# Patient Record
Sex: FEMALE | Race: WHITE | Employment: OTHER | URBAN - METROPOLITAN AREA
[De-identification: names, ages, dates, MRNs, and addresses within clinical notes are randomized per-mention and may not be internally consistent; named-entity substitution may affect disease eponyms.]

---

## 2017-03-23 ENCOUNTER — HOSPITAL ENCOUNTER (OUTPATIENT)
Dept: ULTRASOUND IMAGING | Age: 62
Discharge: HOME | End: 2017-03-23
Attending: INTERNAL MEDICINE
Payer: MEDICARE

## 2017-03-23 DIAGNOSIS — K74.3 PRIMARY BILIARY CIRRHOSIS (HCC): ICD-10-CM

## 2017-03-23 PROCEDURE — 76705 ECHO EXAM OF ABDOMEN: CPT

## 2017-11-08 ENCOUNTER — ANESTHESIA EVENT (OUTPATIENT)
Dept: ENDOSCOPY | Age: 62
End: 2017-11-08
Payer: MEDICARE

## 2017-11-08 RX ORDER — SODIUM CHLORIDE 0.9 % (FLUSH) 0.9 %
5-10 SYRINGE (ML) INJECTION AS NEEDED
Status: CANCELLED | OUTPATIENT
Start: 2017-11-08

## 2017-11-08 RX ORDER — SODIUM CHLORIDE, SODIUM LACTATE, POTASSIUM CHLORIDE, CALCIUM CHLORIDE 600; 310; 30; 20 MG/100ML; MG/100ML; MG/100ML; MG/100ML
100 INJECTION, SOLUTION INTRAVENOUS CONTINUOUS
Status: CANCELLED | OUTPATIENT
Start: 2017-11-08

## 2017-11-09 ENCOUNTER — ANESTHESIA (OUTPATIENT)
Dept: ENDOSCOPY | Age: 62
End: 2017-11-09
Payer: MEDICARE

## 2017-11-09 ENCOUNTER — HOSPITAL ENCOUNTER (OUTPATIENT)
Age: 62
Setting detail: OUTPATIENT SURGERY
Discharge: HOME | End: 2017-11-09
Attending: INTERNAL MEDICINE | Admitting: INTERNAL MEDICINE
Payer: MEDICARE

## 2017-11-09 VITALS
HEIGHT: 63 IN | SYSTOLIC BLOOD PRESSURE: 178 MMHG | RESPIRATION RATE: 16 BRPM | TEMPERATURE: 96.8 F | OXYGEN SATURATION: 98 % | HEART RATE: 70 BPM | DIASTOLIC BLOOD PRESSURE: 74 MMHG | WEIGHT: 186 LBS | BODY MASS INDEX: 32.96 KG/M2

## 2017-11-09 LAB — GLUCOSE BLD STRIP.AUTO-MCNC: 125 MG/DL (ref 65–100)

## 2017-11-09 PROCEDURE — 82962 GLUCOSE BLOOD TEST: CPT

## 2017-11-09 PROCEDURE — 76040000026: Performed by: INTERNAL MEDICINE

## 2017-11-09 PROCEDURE — 74011000250 HC RX REV CODE- 250

## 2017-11-09 PROCEDURE — 74011250636 HC RX REV CODE- 250/636

## 2017-11-09 PROCEDURE — 74011250636 HC RX REV CODE- 250/636: Performed by: ANESTHESIOLOGY

## 2017-11-09 PROCEDURE — 77030009426 HC FCPS BIOP ENDOSC BSC -B: Performed by: INTERNAL MEDICINE

## 2017-11-09 PROCEDURE — 76060000032 HC ANESTHESIA 0.5 TO 1 HR: Performed by: INTERNAL MEDICINE

## 2017-11-09 PROCEDURE — 88305 TISSUE EXAM BY PATHOLOGIST: CPT | Performed by: INTERNAL MEDICINE

## 2017-11-09 PROCEDURE — 74011000250 HC RX REV CODE- 250: Performed by: ANESTHESIOLOGY

## 2017-11-09 RX ORDER — SODIUM CHLORIDE, SODIUM LACTATE, POTASSIUM CHLORIDE, CALCIUM CHLORIDE 600; 310; 30; 20 MG/100ML; MG/100ML; MG/100ML; MG/100ML
100 INJECTION, SOLUTION INTRAVENOUS CONTINUOUS
Status: DISCONTINUED | OUTPATIENT
Start: 2017-11-09 | End: 2017-11-09 | Stop reason: HOSPADM

## 2017-11-09 RX ORDER — PROPOFOL 10 MG/ML
INJECTION, EMULSION INTRAVENOUS AS NEEDED
Status: DISCONTINUED | OUTPATIENT
Start: 2017-11-09 | End: 2017-11-09 | Stop reason: HOSPADM

## 2017-11-09 RX ORDER — LIDOCAINE HYDROCHLORIDE 20 MG/ML
INJECTION, SOLUTION EPIDURAL; INFILTRATION; INTRACAUDAL; PERINEURAL AS NEEDED
Status: DISCONTINUED | OUTPATIENT
Start: 2017-11-09 | End: 2017-11-09 | Stop reason: HOSPADM

## 2017-11-09 RX ORDER — PROPOFOL 10 MG/ML
INJECTION, EMULSION INTRAVENOUS
Status: DISCONTINUED | OUTPATIENT
Start: 2017-11-09 | End: 2017-11-09 | Stop reason: HOSPADM

## 2017-11-09 RX ORDER — FAMOTIDINE 20 MG/1
20 TABLET, FILM COATED ORAL AS NEEDED
Status: DISCONTINUED | OUTPATIENT
Start: 2017-11-09 | End: 2017-11-09 | Stop reason: HOSPADM

## 2017-11-09 RX ADMIN — PROPOFOL 140 MCG/KG/MIN: 10 INJECTION, EMULSION INTRAVENOUS at 12:35

## 2017-11-09 RX ADMIN — FAMOTIDINE 20 MG: 10 INJECTION, SOLUTION INTRAVENOUS at 12:29

## 2017-11-09 RX ADMIN — LIDOCAINE HYDROCHLORIDE 20 MG: 20 INJECTION, SOLUTION EPIDURAL; INFILTRATION; INTRACAUDAL; PERINEURAL at 12:35

## 2017-11-09 RX ADMIN — PROPOFOL 20 MG: 10 INJECTION, EMULSION INTRAVENOUS at 12:44

## 2017-11-09 RX ADMIN — PROPOFOL 50 MG: 10 INJECTION, EMULSION INTRAVENOUS at 12:35

## 2017-11-09 RX ADMIN — SODIUM CHLORIDE, SODIUM LACTATE, POTASSIUM CHLORIDE, AND CALCIUM CHLORIDE 100 ML/HR: 600; 310; 30; 20 INJECTION, SOLUTION INTRAVENOUS at 12:29

## 2017-11-09 NOTE — H&P
History and Physical      Patient: Khoi Smiley    Physician: Smooth Charles MD    Referring Physician: Benjie Merritt MD    Chief Complaint: For colonoscopy and EGD    History of Present Illness: Pt presents for colonoscopy and EGD. Pt with cirrhosis and needs variceal screening. Also needs CRCS. Is having diarrhea. History:  Past Medical History:   Diagnosis Date    Chronic obstructive pulmonary disease (HCC)     daily inhalers    Depression     Gallbladder cancer (HCC)     GERD (gastroesophageal reflux disease)     daily meds    Hypothyroidism     Lupus     Memory loss     OA (osteoarthritis)     Prediabetes     oral agents: doesn't check glucose at home     Past Surgical History:   Procedure Laterality Date    HX CHOLECYSTECTOMY      HX OTHER SURGICAL      liver bx    HX OTHER SURGICAL      wound debridement      Social History     Social History    Marital status:      Spouse name: N/A    Number of children: N/A    Years of education: N/A     Social History Main Topics    Smoking status: Former Smoker    Smokeless tobacco: Never Used      Comment: quit 2016    Alcohol use No    Drug use: Yes     Special: Marijuana    Sexual activity: Not Asked     Other Topics Concern    None     Social History Narrative      History reviewed. No pertinent family history. Medications:   Prior to Admission medications    Medication Sig Start Date End Date Taking? Authorizing Provider   donepezil (ARICEPT) 10 mg tablet Take 10 mg by mouth nightly. Historical Provider   dicyclomine (BENTYL) 10 mg capsule Take 10 mg by mouth daily. Historical Provider   Omeprazole delayed release (PRILOSEC D/R) 20 mg tablet Take 20 mg by mouth every morning. Historical Provider   meloxicam (MOBIC) 15 mg tablet Take 15 mg by mouth daily. Historical Provider   atorvastatin (LIPITOR) 10 mg tablet Take 10 mg by mouth nightly.     Historical Provider   levothyroxine (SYNTHROID) 50 mcg tablet Take 50 mcg by mouth Daily (before breakfast). Historical Provider   vilazodone (VIIBRYD) 40 mg tab tablet Take 40 mg by mouth every morning. Historical Provider   gabapentin (NEURONTIN) 300 mg capsule Take 300 mg by mouth nightly. Historical Provider   ursodiol (ACTIGALL) 500 mg tablet Take 250 mg by mouth three (3) times daily. Historical Provider   lisinopril (PRINIVIL, ZESTRIL) 20 mg tablet Take 20 mg by mouth daily. Historical Provider   metFORMIN (GLUCOPHAGE) 500 mg tablet Take 500 mg by mouth two (2) times daily (with meals). Historical Provider   multivitamin (ONE A DAY) tablet Take 1 Tab by mouth daily. Historical Provider   aspirin delayed-release 81 mg tablet Take 81 mg by mouth daily. Historical Provider   umeclidinium-vilanterol (ANORO ELLIPTA) 62.5-25 mcg/actuation inhaler Take 1 Puff by inhalation every morning. Historical Provider   albuterol (PROAIR HFA) 90 mcg/actuation inhaler Take  by inhalation as needed for Wheezing. Historical Provider       Allergies: Allergies   Allergen Reactions    Adhesive Rash     From nicotine patch    Codeine Other (comments)     \"nightmares, I hear things\"       Physical Exam:     Vital Signs:   Visit Vitals    Temp 98.4 °F (36.9 °C)    Ht 5' 3\" (1.6 m)    Wt 84.4 kg (186 lb)    BMI 32.95 kg/m2     . General: no distress      Heart: regular   Lungs: unlabored   Abdominal: soft   Neurological: Grossly normal        Findings/Diagnosis: Cirrhosis, CRCS, Diarhea    Plan of Care/Planned Procedure: Colonoscopy, possible polypectomy and EGD. Pt/designee agree to proceed.       Signed:  Zuly Myers MD   11/9/2017

## 2017-11-09 NOTE — IP AVS SNAPSHOT
[facility] 
 
 
 [address] [place] [address] [address] [facility] 
[phone] Patient: Macey Polanco MRN: [id number] LTX:[id number] About your hospitalization You were admitted on:  November 9, 2017 You last received care in the:  [facility] You were discharged on:  November 9, 2017 Why you were hospitalized Your primary diagnosis was:  Not on File Discharge Orders None A check jennifer indicates which time of day the medication should be taken. My Medications ASK your physician about these medications Instructions Each Dose to Equal  
 Morning Noon Evening Bedtime ANORO ELLIPTA 62.5-25 mcg/actuation inhaler Generic drug:  umeclidinium-vilanterol Your last dose was: Your next dose is: Take 1 Puff by inhalation every morning. 1 Puff  
    
   
   
   
  
 aspirin delayed-release 81 mg tablet Your last dose was: Your next dose is: Take 81 mg by mouth daily. 81 mg  
    
   
   
   
  
 atorvastatin 10 mg tablet Commonly known as:  LIPITOR Your last dose was: Your next dose is: Take 10 mg by mouth nightly. 10 mg  
    
   
   
   
  
 dicyclomine 10 mg capsule Commonly known as:  BENTYL Your last dose was: Your next dose is: Take 10 mg by mouth daily. 10 mg  
    
   
   
   
  
 donepezil 10 mg tablet Commonly known as:  ARICEPT Your last dose was: Your next dose is: Take 10 mg by mouth nightly. 10 mg  
    
   
   
   
  
 gabapentin 300 mg capsule Commonly known as:  NEURONTIN Your last dose was: Your next dose is: Take 300 mg by mouth nightly. 300 mg  
    
   
   
   
  
 levothyroxine 50 mcg tablet Commonly known as:  SYNTHROID Your last dose was: Your next dose is: Take 50 mcg by mouth Daily (before breakfast). 50 mcg  
    
   
   
   
  
 lisinopril 20 mg tablet Commonly known as:  Kenn Shutters Your last dose was: Your next dose is: Take 20 mg by mouth daily. 20 mg  
    
   
   
   
  
 meloxicam 15 mg tablet Commonly known as:  MOBIC Your last dose was: Your next dose is: Take 15 mg by mouth daily. 15 mg  
    
   
   
   
  
 metFORMIN 500 mg tablet Commonly known as:  GLUCOPHAGE Your last dose was: Your next dose is: Take 500 mg by mouth two (2) times daily (with meals). 500 mg  
    
   
   
   
  
 multivitamin tablet Commonly known as:  ONE A DAY Your last dose was: Your next dose is: Take 1 Tab by mouth daily. 1 Tab Omeprazole delayed release 20 mg tablet Commonly known as:  PRILOSEC D/R Your last dose was: Your next dose is: Take 20 mg by mouth every morning. 20 mg  
    
   
   
   
  
 PROAIR HFA 90 mcg/actuation inhaler Generic drug:  albuterol Your last dose was: Your next dose is: Take  by inhalation as needed for Wheezing. ursodiol 500 mg tablet Commonly known as:  ACTIGALL Your last dose was: Your next dose is: Take 250 mg by mouth three (3) times daily. 250 mg  
    
   
   
   
  
 VIIBRYD 40 mg Tab tablet Generic drug:  vilazodone Your last dose was: Your next dose is: Take 40 mg by mouth every morning. 40 mg Discharge Instructions Gastrointestinal Esophagogastroduodenoscopy (EGD) - Upper Exam Discharge Instructions 1. Call Dr. Ronny Guerin at [phone] for any problems or questions. 2. Contact the doctor's office for follow up appointment as directed. 3. Medication may cause drowsiness for several hours, therefore, do not drive or operate machinery for remainder of the day. 4. No alcohol today. 5. Ordinarily, you may resume regular diet and activity after exam unless otherwise specified by your physician. 6. For mild soreness in your throat you may use Cepacol throat lozenges or warm salt-water gargles as needed. Any additional instructions:   
 
- Follow up pathology 
- Continue medication 
- EGD in 3yrs Instructions given to Lina Aguirre and other family members. Instructions given by:  Cas Garcia RN Gastrointestinal Colonoscopy/Flexible Sigmoidoscopy - Lower Exam Discharge Instructions 1. Call Dr. Sam Flores at [phone] for any problems or questions. 2. Contact the doctors office for follow up appointment as directed 3. Medication may cause drowsiness for several hours, therefore, do not drive or operate machinery for remainder of the day. 4. No alcohol today. 5. Ordinarily, you may resume regular diet and activity after exam unless otherwise specified by your physician. 6. Because of air put into your colon during exam, you may experience some abdominal distension, relieved by the passage of gas, for several hours. 7. Contact your physician if you have any of the following: 
a. Excessive amount of bleeding  large amount when having a bowel movement. Occasional specks of blood with bowel movement would not be unusual. 
b. Severe abdominal pain 
c. Fever or Chills 8. Polyp Removal  follow these additional instructions 
a. Regular diet  
b. Take Metamucil  1 tablespoon in juice every morning for 3 days 
c. No Aspirin, Advil, Aleve, Nuprin, Ibuprofen, or medications that contain these drugs for 2 weeks. Any additional instructions:   
 
 
- Follow up pathology to assess for microscopic colitis - If negative, will treat with Xifaxan for IBS-D 
- If still with diarrhea, consider holding PPI with office visit to follow - Repeat colonoscopy pending pathology Instructions given to Tequila Banda and other family members. Instructions given by:  Yadi Banda RN Introducing [facility]! Russ Martinez introduces [employer] patient portal. Now you can access parts of your medical record, email your doctor's office, and request medication refills online. 1. In your internet browser, go to [email or web address] 2. Click on the First Time User? Click Here link in the Sign In box. You will see the New Member Sign Up page. 3. Enter your [employer] Access Code exactly as it appears below. You will not need to use this code after youve completed the sign-up process. If you do not sign up before the expiration date, you must request a new code. · [employer] Access Code: [email or web address] Expires: 2/4/2018  3:02 PM 
 
4. Enter the last four digits of your Social Security Number (xxxx) and Date of Birth (mm/dd/yyyy) as indicated and click Submit. You will be taken to the next sign-up page. 5. Create a [employer] ID. This will be your [employer] login ID and cannot be changed, so think of one that is secure and easy to remember. 6. Create a [employer] password. You can change your password at any time. 7. Enter your Password Reset Question and Answer. This can be used at a later time if you forget your password. 8. Enter your e-mail address. You will receive e-mail notification when new information is available in [address]. 9. Click Sign Up. You can now view and download portions of your medical record. 10. Click the Download Summary menu link to download a portable copy of your medical information. If you have questions, please visit the Frequently Asked Questions section of the [employer] website. Remember, [employer] is NOT to be used for urgent needs. For medical emergencies, dial 911. Now available from your iPhone and Android! Providers Seen During Your Hospitalization Provider Specialty Primary office phone Zach Us MD Gastroenterology [phone] Your Primary Care Physician (PCP) Primary Care Physician Office Phone Office Fax [address] [facility], [address] [place] [address] [phone] You are allergic to the following Allergen Reactions Adhesive Rash From nicotine patch Codeine Other (comments) \"nightmares, I hear things\" Recent Documentation Height Weight BMI OB Status Smoking Status 1.6 m 84.4 kg 32.95 kg/m2 Postmenopausal Former Smoker Emergency Contacts Name Discharge Info Relation Home Work Mobile Srinivas Yoo DISCHARGE CAREGIVER [3] Spouse [3] [phone] Patient Belongings The following personal items are in your possession at time of discharge: 
  Dental Appliances: Lowers, Uppers (in purse)  Visual Aid: None Please provide this summary of care documentation to your next provider. Signatures-by signing, you are acknowledging that this After Visit Summary has been reviewed with you and you have received a copy. Patient Signature:  ____________________________________________________________ Date:  ____________________________________________________________  
  
Mariya Orellana Provider Signature:  ____________________________________________________________ Date:  ____________________________________________________________

## 2017-11-09 NOTE — PROCEDURES
DATE OF PROCEDURE: 11/9/17    REQUESTING PHYSICIAN: Dr Rachel Mendoza: Esophagogastroduodenoscopy. ENDOSCOPIST: Renny Templeton M.D. PREOPERATIVE DIAGNOSIS: Cirrhosis-eval for varices, Diarrhea    POSTOPERATIVE DIAGNOSIS: Patent Schatzki's ring, Hiatal hernia     INSTRUMENTS: GIF H190    SEDATION: MAC    DESCRIPTION: After informed consent was obtained, the patient was taken to the endoscopy suite and placed in the left lateral decubitus position. Intravenous sedation was administered as above and posterior pharynx was anesthetized with local anesthetic spray. After adequate sedation had been achieved the endoscope was inserted over the tongue and through the posterior pharynx under direct visualization down the esophagus to the stomach and into the second portion of the duodenum. The endoscopic was withdrawn from that point, performing a careful survey of the mucosa. Retroflexion was performed in the gastric fundus. FINDINGS:  Esophagus: Normal appearing mucosa without evidence of varices. There was a patent Schatzki's ring (broken with bx forceps) with a small hiatal hernia. Stomach: Normal mucosa without portal hypertensive gastropathy. Duodenum: Normal duodenal mucosa (deep intubation). Bx's taken. Estimated blood loss:  0 cc-minimal    IMPRESSION:   Patent Schatzki's ring  Hiatal hernia    PLAN:  - F/u path  - Continue meds  - EGD in 3yrs          PROCEDURE: Colonoscopy. PREOPERATIVE DIAGNOSIS: CRCS, Diarrhea    POSTOPERATIVE DIAGNOSIS: Polyp, Diverticulosis, Internal hemorrhoids     SEDATION: MAC    INSTRUMENT: CF H190    DESCRIPTION OF PROCEDURE:  After informed consent was obtained the patient was placed in the left lateral decubitus position. Intravenous sedation was administered as above. After adequate sedation had been achieved, digital rectal examination was performed.  The colonoscope was then inserted through the anus and followed the course of the rectum and colon to the cecum, which was confirmed by visualization of the ileocecal valve and appendiceal orifice. The colonoscope was withdrawn from that point, performing a careful survey of the mucosa. Retroflexion was performed in the rectum. FINDINGS:  Normal appearing colonic mucosa from rectum to cecum without inflammation. Mild sigmoid diverticulosis noted. Brief look into TI was normal. Random colon bx's taken. Internal hemorrhoids seen on retroflexion.      Estimated Blood Loss:  0 cc-min    IMPRESSION:  Colon polyp  Diverticulosis  Internal hemorrhoids    PLAN:  - F/u path to assess for microscopic colitis   - If negative, will treat with Xifaxan for IBS-D  - If still with diarrhea, consider holding PPI with OV to follow  - Repeat colo pending path     P Merle Hurtado MD

## 2017-11-09 NOTE — ANESTHESIA POSTPROCEDURE EVALUATION
Post-Anesthesia Evaluation and Assessment    Patient: Jose Castillo MRN: [id number]  SSN: [id number]    YOB: 1955  Age: 60 y.o. Sex: female       Cardiovascular Function/Vital Signs  Visit Vitals    /64    Pulse 75    Temp 36 °C (96.8 °F)    Resp 16    Ht 5' 3\" (1.6 m)    Wt 84.4 kg (186 lb)    SpO2 99%    BMI 32.95 kg/m2       Patient is status post total IV anesthesia anesthesia for Procedure(s):  ESOPHAGOGASTRODUODENOSCOPY (EGD)  COLONOSCOPY  BMI 35  ESOPHAGOGASTRODUODENAL (EGD) BIOPSY  COLON BIOPSY  ENDOSCOPIC POLYPECTOMY. Nausea/Vomiting: None    Postoperative hydration reviewed and adequate. Pain:  Pain Scale 1: Numeric (0 - 10) (11/09/17 1327)  Pain Intensity 1: 0 (11/09/17 1327)   Managed    Neurological Status: At baseline    Mental Status and Level of Consciousness: Arousable    Pulmonary Status:   O2 Device: Room air (11/09/17 1327)   Adequate oxygenation and airway patent    Complications related to anesthesia: None    Post-anesthesia assessment completed.  No concerns    Signed By: Tristan Garcia MD     November 9, 2017

## 2017-11-09 NOTE — DISCHARGE INSTRUCTIONS
Gastrointestinal Esophagogastroduodenoscopy (EGD) - Upper Exam Discharge Instructions    1. Call Dr. Courtney Jacobson at [phone] for any problems or questions. 2. Contact the doctor's office for follow up appointment as directed. 3. Medication may cause drowsiness for several hours, therefore, do not drive or operate machinery for remainder of the day. 4. No alcohol today. 5. Ordinarily, you may resume regular diet and activity after exam unless otherwise specified by your physician. 6. For mild soreness in your throat you may use Cepacol throat lozenges or warm salt-water gargles as needed. Any additional instructions:      - Follow up pathology  - Continue medication  - EGD in 3yrs     Instructions given to Shahbaz Rod and other family members. Instructions given by:  Lauren Moreno RN              Gastrointestinal Colonoscopy/Flexible Sigmoidoscopy - Lower Exam Discharge Instructions  1. Call Dr. Courtney Jacobson at [phone] for any problems or questions. 2. Contact the doctors office for follow up appointment as directed  3. Medication may cause drowsiness for several hours, therefore, do not drive or operate machinery for remainder of the day. 4. No alcohol today. 5. Ordinarily, you may resume regular diet and activity after exam unless otherwise specified by your physician. 6. Because of air put into your colon during exam, you may experience some abdominal distension, relieved by the passage of gas, for several hours. 7. Contact your physician if you have any of the following:  a. Excessive amount of bleeding - large amount when having a bowel movement. Occasional specks of blood with bowel movement would not be unusual.  b. Severe abdominal pain  c. Fever or Chills  8. Polyp Removal - follow these additional instructions  a. Regular diet   b. Take Metamucil - 1 tablespoon in juice every morning for 3 days  c.  No Aspirin, Advil, Aleve, Nuprin, Ibuprofen, or medications that contain these drugs for 2 weeks.  Any additional instructions:        - Follow up pathology to assess for microscopic colitis   - If negative, will treat with Xifaxan for IBS-D  - If still with diarrhea, consider holding PPI with office visit to follow  - Repeat colonoscopy pending pathology     Instructions given to Jaki Khan and other family members.   Instructions given by:  Arthur Don RN

## 2017-12-28 PROBLEM — K74.60 LIVER CIRRHOSIS SECONDARY TO NASH (HCC): Status: ACTIVE | Noted: 2017-12-28

## 2017-12-28 PROBLEM — R79.89 ELEVATED BRAIN NATRIURETIC PEPTIDE (BNP) LEVEL: Status: ACTIVE | Noted: 2017-12-28

## 2017-12-28 PROBLEM — E78.00 PURE HYPERCHOLESTEROLEMIA: Status: ACTIVE | Noted: 2017-12-28

## 2017-12-28 PROBLEM — Z86.2 H/O: IRON DEFICIENCY ANEMIA: Status: ACTIVE | Noted: 2017-12-28

## 2017-12-28 PROBLEM — I10 ESSENTIAL HYPERTENSION: Status: ACTIVE | Noted: 2017-12-28

## 2017-12-28 PROBLEM — K75.81 LIVER CIRRHOSIS SECONDARY TO NASH (HCC): Status: ACTIVE | Noted: 2017-12-28

## 2019-04-02 ENCOUNTER — HOSPITAL ENCOUNTER (OUTPATIENT)
Dept: MRI IMAGING | Age: 64
Discharge: HOME | End: 2019-04-02
Attending: NURSE PRACTITIONER
Payer: MEDICARE

## 2019-04-02 DIAGNOSIS — M51.36 DDD (DEGENERATIVE DISC DISEASE), LUMBAR: ICD-10-CM

## 2019-04-02 PROCEDURE — 72158 MRI LUMBAR SPINE W/O & W/DYE: CPT

## 2019-04-02 PROCEDURE — 74011250636 HC RX REV CODE- 250/636

## 2019-04-02 PROCEDURE — A9575 INJ GADOTERATE MEGLUMI 0.1ML: HCPCS

## 2019-04-02 RX ORDER — SODIUM CHLORIDE 0.9 % (FLUSH) 0.9 %
10 SYRINGE (ML) INJECTION
Status: COMPLETED | OUTPATIENT
Start: 2019-04-02 | End: 2019-04-02

## 2019-04-02 RX ORDER — GADOTERATE MEGLUMINE 376.9 MG/ML
16 INJECTION INTRAVENOUS
Status: COMPLETED | OUTPATIENT
Start: 2019-04-02 | End: 2019-04-02

## 2019-04-02 RX ADMIN — Medication 10 ML: at 15:21

## 2019-04-02 RX ADMIN — GADOTERATE MEGLUMINE 16 ML: 376.9 INJECTION INTRAVENOUS at 15:21

## 2019-04-04 PROBLEM — Z72.0 TOBACCO ABUSE: Status: ACTIVE | Noted: 2019-04-04

## 2019-04-04 PROBLEM — M21.371 RIGHT FOOT DROP: Status: ACTIVE | Noted: 2019-04-04

## 2019-04-04 PROBLEM — M51.36 DDD (DEGENERATIVE DISC DISEASE), LUMBAR: Status: ACTIVE | Noted: 2019-04-04

## 2019-04-04 PROBLEM — M48.061 LUMBAR STENOSIS WITHOUT NEUROGENIC CLAUDICATION: Status: ACTIVE | Noted: 2019-04-04

## 2019-05-13 ENCOUNTER — HOSPITAL ENCOUNTER (OUTPATIENT)
Dept: PHYSICAL THERAPY | Age: 64
Discharge: HOME | End: 2019-05-13
Payer: MEDICARE

## 2019-05-13 PROCEDURE — 97162 PT EVAL MOD COMPLEX 30 MIN: CPT

## 2019-05-13 NOTE — THERAPY EVALUATION
Max Valero  : 1955  Primary: [place]m*  Secondary:  [address]  at [address], [address] [place] [address]  [phone]   [phone]       OUTPATIENT PHYSICAL THERAPY:Initial Assessment 2019    ICD-10: Treatment Diagnosis: lesion of sciatic nerve, right lower limp (G57.01)                Treatment Diagnosis 2: low back pain (M54.5)                Treatment Diagnosis 3: gait dysfunction (R26.89)  Precautions: hypertension, history of falls, history of syncope  Allergies: Adhesive and Codeine   MD Orders: Evaluate and Treat  MEDICAL/REFERRING DIAGNOSIS:  Lesion of sciatic nerve, right lower limb [G57.01]   DATE OF ONSET: 2019 when patient fell/had a syncopic episode suddenly at home and then sustained foot drop a few days later  REFERRING PHYSICIAN: Valarie Hermosillo MD  RETURN PHYSICIAN APPOINTMENT: not scheduled     INITIAL ASSESSMENT:  Ms. Rakel Chiu is a 71 y.o. female presenting to physical therapy with complaints of low back pain, right leg and ankle pain, and right foot drop that started after falling/experiencing a syncopic episode in 2019. She reported going to the ER and they reported the likely cause for the episode was medication, but then she noticed a few days later that she was unable to feet her foot or lift it independently or with gait. She has undergone full examination with Dr Elen Grewal who diagnosed her with arthritis and lesion of the L4, L5, and S1 nerve roots on the right. She is not a surgical cadidate, and has been recommended to undergo epidural injections of the lumbar spine. She is eager to improve strength of the foot and ankle, return to work as a  at [employer], and improve independence overall.   She is a good candidate for skilled intervention with services to include manual therapy, modalities as needed, therapeutic exercises, gait/stair/transfer/balance training, and activity modification. PROBLEM LIST (Impacting functional limitations):  1. Decreased Strength  2. Decreased ADL/Functional Activities  3. Decreased Transfer Abilities  4. Decreased Ambulation Ability/Technique  5. Decreased Balance  6. Increased Pain  7. Decreased Activity Tolerance  8. Decreased Pacing Skills  9. Increased Fatigue  10. Increased Shortness of Breath  11. Decreased Flexibility/Joint Mobility INTERVENTIONS PLANNED: (Treatment may consist of any combination of the following)  1. Balance Exercise  2. Cold  3. Cryotherapy  4. Electrical Stimulation  5. Family Education  6. Gait Training  7. Heat  8. Home Exercise Program (HEP)  9. Manual Therapy  10. Neuromuscular Re-education/Strengthening  11. Range of Motion (ROM)  12. Therapeutic Exercise/Strengthening  13. Transcutaneous Electrical Nerve Stimulation (TENS)  14. Transfer Training  15. Ultrasound (US)   TREATMENT PLAN:  Effective Dates: 5/13/2019 TO 6/24/2019 Frequency/Duration: 1-2 times a week for 6 weeks depending on insurance and scheduling  GOALS: (Goals have been discussed and agreed upon with patient.)  Short-Term Functional Goals: Time Frame: 5/13/2019 to 6/3/2019  1. Patient demonstrates independence with home exercise program without verbal cueing provided by therapist.  2. Improve seated posture with decreased forward head, forward shoulders, and thoracic kyphosis without cuing. 3. Improve flexibility of gastrocnemius and hamstrings with SLR to 90 degrees. Discharge Goals: Time Frame: 5/13/2019 to 6/24/2019  1. Improve pain to 5/10 at the most with standing, walking, sleeping, and exercising for health. 2. Improve strength of dorsiflexors, ankle eversion, and plantarflexion by at least 1 full muscle grade. 3. Improve gait with and without use of straight cane with appropriate technique, independence, and safety. 4. Improve low back symptoms with performance of HEP and postural awareness.   5. 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 6. Improve Oswestry Low Back Index score to 10/50 from 15/50. Outcome Measure: Tool Used: Modified Oswestry Low Back Pain Questionnaire  Score:  Initial: 15/50  Most Recent: X/50 (Date: -- )   Interpretation of Score: Each section is scored on a 0-5 scale, 5 representing the greatest disability. The scores of each section are added together for a total score of 50. 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 · Skilled intervention continues to be required due to weakness, decreased flexibility, improved posture, and improved function. Reason for Services/Other Comments:  · Patient continues to require skilled intervention due to increasing complexity of exercises. Total Evaluation Duration: 30 minutes    Rehabilitation Potential For Stated Goals: Good  Regarding Tiffani Yoo's therapy, I certify that the treatment plan above will be carried out by a therapist or under their direction. Thank you for this referral,  Haroldo Mcdonald PT     Referring Physician Signature: Cecilia Uribe MD              Date                     PAIN/SUBJECTIVE:    Initial: Pain Intensity 1: 8  Pain Location 1: Back, Leg  Pain Orientation 1: Mid, Right  Post Session:  8/10    HISTORY:    History of Injury/Illness (Reason for Referral):  Ms. Omega Borjas is a 71 y.o. female presenting to physical therapy with complaints of low back pain, right leg and ankle pain, and right foot drop that started after falling/experiencing a syncopic episode in March 2019.   She reported going to the ER and they reported the likely cause for the episode was medication, but then she noticed a few days later that she was unable to feet her foot or lift it independently or with gait. She has undergone full examination with Dr Pedro Pablo Ponce who diagnosed her with arthritis and lesion of the L4, L5, and S1 nerve roots on the right. She is not a surgical cadidate, and has been recommended to undergo epidural injections of the lumbar spine. She is eager to improve strength of the foot and ankle, return to work as a  at [employer], and improve independence overall. Past Medical History/Comorbidities:   Ms. Tami Frankel  has a past medical history of Chronic obstructive pulmonary disease ([address] Depression, Gallbladder cancer ([address] GERD (gastroesophageal reflux disease), Hypothyroidism, Lupus ([address] Lupus ([address] Memory loss, OA (osteoarthritis), and Prediabetes. Ms. Tami Frankel  has a past surgical history that includes hx cholecystectomy; hx other surgical; hx other surgical; hx hysterectomy (1977); and colonoscopy (N/A, 11/9/2017). Social History/Living Environment:     Patient lives at home with  and reports assistance from him with any painful or difficult activities. Prior Level of Function/Work/Activity:  Independent without dysfunction. 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 She also likes to exercise for health and is unable to due to weakness and dysfunction. Dominant Side:         RIGHT    Ambulatory/Rehab Services H2 Model Falls Risk Assessment    Risk Factors:       (2)  Any administered antiepileptics/anticonvulsants       (1)  Any administered benzodiazepines       (5)  History of Recent Falls [w/in 3 months] Ability to Rise from Chair:       (1)  Pushes up, successful in one attempt    Falls Prevention Plan:       Exercise/Equipment Adaptation (specify):  advised patient to use a straight cane for ambulation and supervision with gait    Total: (5 or greater = High Risk): 9    ©2010 [facility]. [facility] Patent #[id number].  Federal Law prohibits the replication, distribution or use without written permission from [facility] [employer] (P) [facility]    Current Medications:        Current Outpatient Medications:     amLODIPine (NORVASC) 5 mg tablet, Take 2 Tabs by mouth daily. , Disp: 180 Tab, Rfl: 1    fluticasone-umeclidin-vilanter (TRELEGY ELLIPTA) 100-62.5-25 mcg dsdv, Take 1 Puff by inhalation daily. , Disp: , Rfl:     varenicline (CHANTIX) 1 mg tablet, Take 1 mg by mouth two (2) times daily (after meals). , Disp: , Rfl:     busPIRone (BUSPAR) 15 mg tablet, Take 15 mg by mouth three (3) times daily. Takes PRN only, Disp: , Rfl:     docusate sodium (STOOL SOFTENER) 100 mg tab, Take  by mouth., Disp: , Rfl:     baclofen (LIORESAL) 10 mg tablet, Take  by mouth three (3) times daily. , Disp: , Rfl:     B.infantis-B.ani-B.long-B.bifi (PROBIOTIC 4X) 10-15 mg TbEC, Take  by mouth., Disp: , Rfl:     LINACLOTIDE (LINZESS PO), Take  by mouth., Disp: , Rfl:     donepezil (ARICEPT) 10 mg tablet, Take 10 mg by mouth nightly., Disp: , Rfl:     dicyclomine (BENTYL) 10 mg capsule, Take 10 mg by mouth daily. , Disp: , Rfl:     Omeprazole delayed release (PRILOSEC D/R) 20 mg tablet, Take 20 mg by mouth every morning., Disp: , Rfl:     meloxicam (MOBIC) 15 mg tablet, Take 15 mg by mouth daily. , Disp: , Rfl:     atorvastatin (LIPITOR) 10 mg tablet, Take 10 mg by mouth nightly., Disp: , Rfl:     levothyroxine (SYNTHROID) 50 mcg tablet, Take 50 mcg by mouth Daily (before breakfast). , Disp: , Rfl:     vilazodone (VIIBRYD) 40 mg tab tablet, Take 40 mg by mouth every morning., Disp: , Rfl:     gabapentin (NEURONTIN) 300 mg capsule, Take 300 mg by mouth nightly., Disp: , Rfl:     ursodiol (ACTIGALL) 500 mg tablet, Take 250 mg by mouth three (3) times daily. , Disp: , Rfl:     lisinopril (PRINIVIL, ZESTRIL) 20 mg tablet, Take 20 mg by mouth daily. , Disp: , Rfl:     multivitamin (ONE A DAY) tablet, Take 1 Tab by mouth daily. , Disp: , Rfl:     aspirin delayed-release 81 mg tablet, Take 81 mg by mouth daily. , Disp: , Rfl:     albuterol (PROAIR HFA) 90 mcg/actuation inhaler, Take  by inhalation as needed for Wheezing., Disp: , Rfl:     Date Last Reviewed:  5/13/2019    Number of Personal Factors/Comorbidities that affect the Plan of Care (medical history and falls risk): 1-2: MODERATE COMPLEXITY    EXAMINATION:      Observation/Postural and Gait Assessment: Patient ambulates with surface orientation and contact guard assistance from her . She has been advised to utilize a straight cane for ambulation to ensure safety and reduce falls risk. Patient is visibly atrophied of right calf and ambulates with AFO to control foot drop and steppage gait with ambulation. Patient sits with moderate forward head, forward shoulders, and thoracic kyphosis. Palpation: Gross tenderness to palpation of lumbar paraspinals. AROM:   Lumbar extension: 15° with pain   Lumbar flexion: 60° with pain   Lumbar left side bend: 15°   Lumbar right side bend: 15°     AROM (PROM) Left Right   Hip flexion 120° 120°   Hip extension At least to table At least to table   Hip external rotation (ER) 45° 45°   Hip internal rotation (IR) 30° 30°     Strength:  Manual Muscle Test (out of 5) Left Right   Knee extension 5 5   Knee flexion 5 5   Hip flexion 4 4   Hip extension 3 3   Hip abduction 3 3   Ankle inversion 5 4+   Ankle eversion 5 2 visible contraction   Great toe extension 5 2   Ankle DF 5 1 (visible contraction with no movement gravity reduced)   Ankle PF 5 4 (unable to heel raise)   Gross abdominal strength 3/5 as observed with transfers       Passive Accessory Motion: Grossly hypomobile of thoracic spine into extension with posterior to anterior mobilization. Special Tests:  Decreased back and leg pain with Fredy's flexion exercises.     Neurological Screen:  Myotomes: Key muscle strength testing through bilateral LE is reduced for ankle eversion, dorsiflexion, great toe extension and plantarflexion (L4, L5, S1). Dermatomes: Sensation testing through bilateral lower quadrants for light touch is reduced grossly for right foot and ankle. Reflexes: Patellar (L4) and Achilles (S1) are 1+ on the right, 2+ on the right for ankle and knee jerk 1+ on the right and 2+ on the left. Neural tension tests: Passive straight leg raise (SLR) test is negative. Crossed SLR test is negative. Slump test is negative. Femoral nerve stretch test is negative. Body Structures Involved:  1. Joints  2. Muscles Body Functions Affected:  1. Sensory/Pain  2. Neuromusculoskeletal Activities and Participation Affected:  1. General Tasks and Demands  2. Self Care  3. Domestic Life  4.  Community, Social and [place]    Number of elements (examined above) that affect the Plan of Care: 3: MODERATE COMPLEXITY    CLINICAL PRESENTATION:    Presentation: Evolving clinical presentation with changing clinical characteristics: MODERATE COMPLEXITY    CLINICAL DECISION MAKING:    Use of outcome tool(s) and clinical judgement create a POC that gives a: Questionable prediction of patient's progress: MODERATE COMPLEXITY

## 2019-05-16 ENCOUNTER — HOSPITAL ENCOUNTER (OUTPATIENT)
Dept: PHYSICAL THERAPY | Age: 64
Discharge: HOME | End: 2019-05-16
Payer: MEDICARE

## 2019-05-16 PROCEDURE — 97110 THERAPEUTIC EXERCISES: CPT

## 2019-05-16 NOTE — PROGRESS NOTES
Mckinley Wallace  : [birth year]  Primary: [place]m*  Secondary:  [address] at [address], [place], [address] [place] [address]  [phone]   [phone]      OUTPATIENT PHYSICAL THERAPY: Daily Treatment Note 2019    Pre-treatment Symptoms/Complaints:  Patient reported mainly having dull constant aching pain with occasional sharp pains. Pain: Initial: Pain Intensity 1: 4  Pain Location 1: Back  Pain Orientation 1: Lower, Left, Right  Post Session:  2/10 reduced pain level   Medications Last Reviewed:  2019  REFERRING PHYSICIAN: Andria Vernon MD  RETURN PHYSICIAN APPOINTMENT: not scheduled    Precautions: hypertension, history of falls, history of syncope (CHECK VITALS)    Date of Onset: 2019 when patient fell/had a syncopic episode suddenly at home and then sustained foot drop a few days later    Updated Objective Findings:  See evaluation note from today   TREATMENT:   THERAPEUTIC EXERCISE: (40 minutes):  Exercises per grid below to improve mobility, strength, balance and coordination. Required minimal visual, verbal and manual cues to promote proper body alignment, promote proper body posture and promote proper body mechanics. Progressed resistance, range, repetitions and complexity of movement as indicated.      Date:  2019 Date:  19 Date:     Activity/Exercise Parameters Parameters Parameters   Single knee to chest 3 x 30 sec 3x30 sec hold BLE's     Double knee to chest 3 x 30 sec 4x30 sec hold     Hamstring stretch 3 x 30 sec strap 4x30 sec hold BLE's     Piriformis stretch - knee only 3 x 30 sec 4x30 sec hold     Calf stretch Strap 3 x 30 sec Strap 4x30 sec hold     Band plantarflexion Green 2 x 10 Green 2x10 reps     Seated dorsiflexion actively - keep foot as plantarflexed as possible to improved range of active dorsiflexion in sitting 2 x 10 bilaterally 2x10 bilaterally     Time spent on educating patient with proper technique and proper body mechanics with HEP. MANUAL THERAPY: (0 minutes): none today was utilized and necessary because of the patient's none today   None today    MODALITIES: (10 minutes): *  Ultrasound was used today secondary to the patient having tightened structures limiting joint motion that require an increase in extensibility. Ultrasound was used today to reduce pain and reduce joint stiffness. Pt. received pulsed ultrasound @1.5 cm2 x 10 mins in prone     HEP: As above; handouts given to patient for all exercises. Treatment/Session Summary:    · Response to Treatment:  Pt. was compliant with all exercises and reported decrease pain. .  · Baseline vitals (5/13/2019): BP: 165/70, HR: 59, SpO: 96%  · Communication/Consultation:  Consulted for entire session with patient's  Annis Kanner  · Equipment provided today:  None today  · Recommendations/Intent for next treatment session: Next visit will focus on strengthening, ambulation with straight cane, balance, and stretching/ROM for lumbar spine.   Treatment Plan of Care Effective Dates: 5/13/2019 to 6/24/2019    Total Treatment Billable Duration:  40 mins   PT Patient Time In/Time Out  Time In: 1000  Time Out: [facility]

## 2019-05-21 ENCOUNTER — HOSPITAL ENCOUNTER (OUTPATIENT)
Dept: PHYSICAL THERAPY | Age: 64
Discharge: HOME | End: 2019-05-21
Payer: MEDICARE

## 2019-05-21 PROCEDURE — 97110 THERAPEUTIC EXERCISES: CPT

## 2019-05-21 NOTE — PROGRESS NOTES
Zack Francisco  :   Primary: [place]m*  Secondary:  [address] at [address], [place], [address] [place] [address]  [phone]   [phone]      OUTPATIENT PHYSICAL THERAPY: Daily Treatment Note 2019    Pre-treatment Symptoms/Complaints:  Patient reported still reported bilateral low back pain. Pain: Initial: Pain Intensity 1: 4  Pain Location 1: Back  Pain Orientation 1: Lower, Left, Medial, Right  Post Session:  2/10 going to eat and stop by her primary doctor about getting an antidepressant    Medications Last Reviewed:  2019  REFERRING PHYSICIAN: Mariana Hernandez MD  RETURN PHYSICIAN APPOINTMENT: not scheduled    Precautions: hypertension, history of falls, history of syncope (CHECK VITALS)    Date of Onset: 2019 when patient fell/had a syncopic episode suddenly at home and then sustained foot drop a few days later    Updated Objective Findings:  /90 pulse 55    TREATMENT:   THERAPEUTIC EXERCISE: (40 minutes):  Exercises per grid below to improve mobility, strength, balance and coordination. Required minimal visual, verbal and manual cues to promote proper body alignment, promote proper body posture and promote proper body mechanics. Progressed resistance, range, repetitions and complexity of movement as indicated.      Date:  2019 Date:  19 Date:  19   Activity/Exercise Parameters Parameters Parameters   Single knee to chest 3 x 30 sec 3x30 sec hold BLE's  4x30 sec hold    Double knee to chest 3 x 30 sec 4x30 sec hold  4x30 sec hold    Hamstring stretch 3 x 30 sec strap 4x30 sec hold BLE's  4x30 sec hold BLE's    Piriformis stretch - knee only 3 x 30 sec 4x30 sec hold  4x30 sec hold    Calf stretch Strap 3 x 30 sec Strap 4x30 sec hold  On incline 4x30 sec hold    Band plantarflexion Green 2 x 10 Green 2x10 reps  Green band 2x10 reps    Seated dorsiflexion actively - keep foot as plantarflexed as possible to improved range of active dorsiflexion in sitting 2 x 10 bilaterally 2x10 bilaterally  2x10 bilaterally    Time spent on educating patient with proper technique and proper body mechanics with HEP. MANUAL THERAPY: (0 minutes): none today was utilized and necessary because of the patient's none today   None today patient declined    MODALITIES: (0 minutes):      none today patient declined. HEP: As above; handouts given to patient for all exercises. Treatment/Session Summary:    · Response to Treatment:  Pt. was very emotional during session, mad and then crying. .  · Baseline vitals (5/13/2019): BP: 165/70, HR: 59, SpO: 96%  · Communication/Consultation:  Consulted for entire session with patient's  Reginaldo Gifford  · Equipment provided today:  None today  · Recommendations/Intent for next treatment session: Next visit will focus on strengthening, ambulation with straight cane, balance, and stretching/ROM for lumbar spine.   Treatment Plan of Care Effective Dates: 5/13/2019 to 6/24/2019    Total Treatment Billable Duration:  40 mins   PT Patient Time In/Time Out  Time In: 1000  Time Out: [address], PTA

## 2019-05-24 ENCOUNTER — HOSPITAL ENCOUNTER (OUTPATIENT)
Dept: PHYSICAL THERAPY | Age: 64
Discharge: HOME | End: 2019-05-24
Payer: MEDICARE

## 2019-05-24 PROCEDURE — 97110 THERAPEUTIC EXERCISES: CPT

## 2019-05-24 PROCEDURE — 97035 APP MDLTY 1+ULTRASOUND EA 15: CPT

## 2019-05-24 NOTE — PROGRESS NOTES
Briseyda Rodriguez  :   Primary: [place]aly [place]m*  Secondary:  [address] at [facility]  [facility], [facility], [address] [place] [address]  [phone]   [phone]      OUTPATIENT PHYSICAL THERAPY: Daily Treatment Note 2019    Pre-treatment Symptoms/Complaints:  Patient reported being very upset about her back pain today. Entered the clinic and started session crying. Pain: Initial: Pain Intensity 1: 5  Pain Location 1: Back  Pain Orientation 1: Left, Lower, Right  Post Session:  2/10 going to eat and stop by her primary doctor about getting an antidepressant    Medications Last Reviewed:  2019  REFERRING PHYSICIAN: Clement Kaur MD  RETURN PHYSICIAN APPOINTMENT: not scheduled    Precautions: hypertension, history of falls, history of syncope (CHECK VITALS)    Date of Onset: 2019 when patient fell/had a syncopic episode suddenly at home and then sustained foot drop a few days later    Updated Objective Findings:  /90 pulse 55    TREATMENT:   THERAPEUTIC EXERCISE: (45 minutes):  Exercises per grid below to improve mobility, strength, balance and coordination. Required minimal visual, verbal and manual cues to promote proper body alignment, promote proper body posture and promote proper body mechanics. Progressed resistance, range, repetitions and complexity of movement as indicated.      Date:  2019 Date:  19 Date:  19   Activity/Exercise Parameters Parameters Parameters   Angle board 3 x 30 sec no shoes or brace     Single knee to chest --- 3x30 sec hold BLE's  4x30 sec hold    Double knee to chest --- 4x30 sec hold  4x30 sec hold    Hamstring stretch --- 4x30 sec hold BLE's  4x30 sec hold BLE's    Piriformis stretch - knee only -- 4x30 sec hold  4x30 sec hold    Calf stretch Strap 3 x 30 sec Strap 4x30 sec hold  On incline 4x30 sec hold    Band plantarflexion Green 3 x 10 Green 2x10 reps  Green band 2x10 reps    3 way band Red 3 x 10 with active assisted dorsiflexion and eversion     Seated dorsiflexion actively - keep foot as plantarflexed as possible to improved range of active dorsiflexion in sitting 3 x 10 bilaterally 2x10 bilaterally  2x10 bilaterally      In sitting with legs propped up, NMES 250 pulse width, 75 pulse frequency, 5 second ramp, on 15 sec, off 50 sec - 15 minutes with active and active assisted dorsiflexion - 2 pads on tibialis anterior      MANUAL THERAPY: (0 minutes): none today was utilized and necessary because of the patient's none today   None today patient declined    MODALITIES: (10 minutes): *  Ultrasound was used today secondary to the patient having tightened structures limiting joint motion that require an increase in extensibility. Ultrasound was used today to reduce pain and reduce spasm. frequency 1, intensity 1.4, in left sidelying pillow between the knees     HEP: As above; handouts given to patient for all exercises. Treatment/Session Summary:    · Response to Treatment:  Patient tolerated electrical stimulation well with dorsiflexion. Will continue to progress as tolerated per patient emotions. .  · Baseline vitals (5/13/2019): BP: 165/70, HR: 59, SpO: 96%  · Communication/Consultation:  Consulted for entire session with patient's  Dana Christian  · Equipment provided today:  None today  · Recommendations/Intent for next treatment session: Next visit will focus on strengthening, ambulation with straight cane, balance, and stretching/ROM for lumbar spine.   Treatment Plan of Care Effective Dates: 5/13/2019 to 6/24/2019    Total Treatment Billable Duration:  55 mins   PT Patient Time In/Time Out  Time In: 1000  Time Out: 1200 Priyank Hunt, PT

## 2019-05-28 ENCOUNTER — HOSPITAL ENCOUNTER (OUTPATIENT)
Dept: PHYSICAL THERAPY | Age: 64
Discharge: HOME | End: 2019-05-28
Payer: MEDICARE

## 2019-05-28 PROCEDURE — 97110 THERAPEUTIC EXERCISES: CPT

## 2019-05-28 NOTE — PROGRESS NOTES
Espinoza Hernandez  :   Primary: [place]  Secondary:  [address] at [address], [address] [place] [address]  [phone]   [phone]      OUTPATIENT PHYSICAL THERAPY: Daily Treatment Note 2019    Pre-treatment Symptoms/Complaints:  Patient reported tolerating HEP well at home. Unable to do it daily but she has been trying to walk when possible. Pain: Initial: Pain Intensity 1: 4  Post Session:  4/10   Medications Last Reviewed:  2019  REFERRING PHYSICIAN: Patrick Woodward MD  RETURN PHYSICIAN APPOINTMENT: not scheduled    Precautions: hypertension, history of falls, history of syncope (CHECK VITALS)    Date of Onset: 2019 when patient fell/had a syncopic episode suddenly at home and then sustained foot drop a few days later    Updated Objective Findings:  /90 pulse 55    TREATMENT:   THERAPEUTIC EXERCISE: (55 minutes):  Exercises per grid below to improve mobility, strength, balance and coordination. Required minimal visual, verbal and manual cues to promote proper body alignment, promote proper body posture and promote proper body mechanics. Progressed resistance, range, repetitions and complexity of movement as indicated.      Date:  2019 Date:  19 Date:  19   Activity/Exercise Parameters Parameters Parameters   nustep  3 minutes, level 1     Angle board 3 x 30 sec no shoes or brace 3 x 30 sec no shoes or brace    Single knee to chest --- 3x30 sec hold BLE's  4x30 sec hold    Double knee to chest --- 4x30 sec hold  4x30 sec hold    Hamstring stretch 3 x 30 sec 4x30 sec hold BLE's  4x30 sec hold BLE's    Piriformis stretch - knee only -- 4x30 sec hold  4x30 sec hold    Calf stretch Strap 3 x 30 sec Strap 4x30 sec hold  On incline 4x30 sec hold    Band plantarflexion Black 3 x 10 Green 2x10 reps  Green band 2x10 reps    3 way band Red 3 x 10 with active assisted dorsiflexion and eversion     Seated dorsiflexion actively - keep foot as plantarflexed as possible to improved range of active dorsiflexion in sitting 3 x 10 bilaterally 2x10 bilaterally  2x10 bilaterally      In sitting with legs propped up, [place] 10 sec, off 50 sec - 10 minutes with active and active assisted dorsiflexion BY THERAPIST - 2 pads on tibialis anterior      MANUAL THERAPY: (0 minutes): none today was utilized and necessary because of the patient's none today   None today patient declined    MODALITIES: (10 minutes): *  Ultrasound was used today secondary to the patient having tightened structures limiting joint motion that require an increase in extensibility. Ultrasound was used today to reduce pain and reduce spasm. frequency 1, intensity 1.4, in left sidelying pillow between the knees  - no charge due to insurance    HEP: As above; handouts given to patient for all exercises. Treatment/Session Summary:    · Response to Treatment:  Improvements noted with some wiggling of toes with dorsiflexion and eversion actively improving through ROM. Will continue to progress as tolerated. MD office was called today concerning patient's emotional status of crying the entire session. MD office reported that they have been adjusting medications and are aware of emotional status. And to continue therapy as long as patient was agreeable. Patient was advised that she did not have to do therapy if she did not want to complete it. · Baseline vitals (5/13/2019): BP: 165/70, HR: 59, SpO: 96%  · Communication/Consultation:  Consulted for entire session with patient's  Renetta Wu  · Equipment provided today:  None today  · Recommendations/Intent for next treatment session: Next visit will focus on strengthening, ambulation with straight cane, balance, and stretching/ROM for lumbar spine.   Treatment Plan of Care Effective Dates: 5/13/2019 to 6/24/2019    Total Treatment Billable Duration:  65 mins   PT Patient Time In/Time Out  Time In: 1314  Time Out: 1202 [address] [place] [address]

## 2019-05-29 ENCOUNTER — HOSPITAL ENCOUNTER (OUTPATIENT)
Dept: PHYSICAL THERAPY | Age: 64
Discharge: HOME | End: 2019-05-29
Payer: MEDICARE

## 2019-05-30 ENCOUNTER — APPOINTMENT (OUTPATIENT)
Dept: PHYSICAL THERAPY | Age: 64
End: 2019-05-30
Payer: MEDICARE

## 2019-06-04 ENCOUNTER — HOSPITAL ENCOUNTER (OUTPATIENT)
Dept: PHYSICAL THERAPY | Age: 64
Discharge: HOME | End: 2019-06-04
Payer: MEDICARE

## 2019-06-04 PROCEDURE — 97110 THERAPEUTIC EXERCISES: CPT

## 2019-06-04 NOTE — PROGRESS NOTES
Edita Escobar  : 1976  Primary: [place]  Secondary:  [address] at [address]  [address], [facility], [address] [place] [address]  [phone]   [phone]      OUTPATIENT PHYSICAL THERAPY: Daily Treatment Note 2019    Pre-treatment Symptoms/Complaints:  Patient reported receiving steroid injections a week ago. Since that time she reported increased energy and able to do more. Pain: Initial: Pain Intensity 1: 2  Pain Location 1: Back  Pain Orientation 1: Lower, Left, Right  Post Session:  1/10   Medications Last Reviewed:  2019  REFERRING PHYSICIAN: Anne Hays MD  RETURN PHYSICIAN APPOINTMENT: not scheduled    Precautions: hypertension, history of falls, history of syncope (CHECK VITALS)    Date of Onset: 2019 when patient fell/had a syncopic episode suddenly at home and then sustained foot drop a few days later    Updated Objective Findings:  Pt. was able to ambulate better with less antalgic gait deviations     TREATMENT:   THERAPEUTIC EXERCISE: (55 minutes):  Exercises per grid below to improve mobility, strength, balance and coordination. Required minimal visual, verbal and manual cues to promote proper body alignment, promote proper body posture and promote proper body mechanics. Progressed resistance, range, repetitions and complexity of movement as indicated.      Date:  2019 Date:  19 Date:  19   Activity/Exercise Parameters Parameters Parameters   nustep  3 minutes, level 1  Level 3 x 12 mins    Angle board 3 x 30 sec no shoes or brace 3 x 30 sec no shoes or brace 4x30 sec hold no brace or shoes   Single knee to chest --- 3x30 sec hold BLE's  4x30 sec hold    Double knee to chest --- 4x30 sec hold  4x30 sec hold    Hamstring stretch 3 x 30 sec 4x30 sec hold BLE's  4x30 sec hold BLE's    Piriformis stretch - knee only -- 4x30 sec hold  4x30 sec hold    Calf stretch Strap 3 x 30 sec Strap 4x30 sec hold  On incline 4x30 sec hold    Band plantarflexion Black 3 x 10 Green 2x10 reps  Green band 2x10 reps    3 way band Red 3 x 10 with active assisted dorsiflexion and eversion  Red band x 20 reps each direction assistance with dorsiflexion & eversion   Seated dorsiflexion actively - keep foot as plantarflexed as possible to improved range of active dorsiflexion in sitting 3 x 10 bilaterally 2x10 bilaterally  2x10 bilaterally          MANUAL THERAPY: (0 minutes): none today was utilized and necessary because of the patient's none today   None today patient declined    MODALITIES: (0 minutes): *  Ultrasound was used today secondary to the patient having tightened structures limiting joint motion that require an increase in extensibility. Ultrasound was used today to reduce pain and reduce spasm. HEP: As above; handouts given to patient for all exercises. Treatment/Session Summary:    · Response to Treatment:  Pt. was compliant with exercises after reviewing each exercise with patient and her . · Baseline vitals (5/13/2019): BP: 165/70, HR: 59, SpO: 96%  · Communication/Consultation:  Patient and her  were present and educated proper technique and body mechanics with each exercise. · Equipment provided today:  None today  · Recommendations/Intent for next treatment session: Next visit will focus on strengthening, ambulation with straight cane, balance, and stretching/ROM for lumbar spine.   Treatment Plan of Care Effective Dates: 5/13/2019 to 6/24/2019    Total Treatment Billable Duration:  55 mins   PT Patient Time In/Time Out  Time In: 0800  Time Out: 0900  Bob Atkins PTA

## 2019-06-06 ENCOUNTER — HOSPITAL ENCOUNTER (OUTPATIENT)
Dept: PHYSICAL THERAPY | Age: 64
Discharge: HOME | End: 2019-06-06
Payer: MEDICARE

## 2019-06-06 PROCEDURE — 97110 THERAPEUTIC EXERCISES: CPT

## 2019-06-06 NOTE — PROGRESS NOTES
Ray Loss  : [id number]  Primary: [place]m*  Secondary:  [address] at [address], [address]  [phone]   [phone]      OUTPATIENT PHYSICAL THERAPY: Daily Treatment Note 2019    Pre-treatment Symptoms/Complaints:  Patient reported her injections have worn off and will go see a pain doctor on . Pain: Initial: Pain Intensity 1: 5  Pain Location 1: Back  Pain Orientation 1: Lower, Right  Post Session:  10 less tightness. Medications Last Reviewed:  2019  REFERRING PHYSICIAN: Erik Upton MD  RETURN PHYSICIAN APPOINTMENT: not scheduled    Precautions: hypertension, history of falls, history of syncope (CHECK VITALS)    Date of Onset: 2019 when patient fell/had a syncopic episode suddenly at home and then sustained foot drop a few days later    Updated Objective Findings:  Pt. was able to ambulate better with less antalgic gait deviations     TREATMENT:   THERAPEUTIC EXERCISE: (55 minutes):  Exercises per grid below to improve mobility, strength, balance and coordination. Required minimal visual, verbal and manual cues to promote proper body alignment, promote proper body posture and promote proper body mechanics. Progressed resistance, range, repetitions and complexity of movement as indicated.      Date:  19 Date:  19 Date:  19   Activity/Exercise Parameters Parameters Parameters   nustep  6 minutes, level 4  Level 3 x 12 mins    Angle board 4 x 30 sec no shoes or brace 3 x 30 sec no shoes or brace 4x30 sec hold no brace or shoes   Single knee to chest 4x30  3x30 sec hold BLE's  4x30 sec hold    Double knee to chest 4x30 sec hold  4x30 sec hold  4x30 sec hold    Hamstring stretch 4 x 30 sec hold strap 4x30 sec hold BLE's  4x30 sec hold BLE's    Piriformis stretch - knee only 4x30 sec hold  4x30 sec hold  4x30 sec hold    Calf stretch On incline 4x30 sec hold  Strap 4x30 sec hold  On incline 4x30 sec hold    Band plantarflexion Green 2x10  Green 2x10 reps  Green band 2x10 reps    3 way band Red 2  x 10 with active assisted dorsiflexion and eversion  Red band x 20 reps each direction assistance with dorsiflexion & eversion   Seated dorsiflexion actively - keep foot as plantarflexed as possible to improved range of active dorsiflexion in sitting 3 x 10 bilaterally 2x10 bilaterally  2x10 bilaterally          MANUAL THERAPY: (0 minutes): none today was utilized and necessary because of the patient's none today   None today patient declined    MODALITIES: (0 minutes): *  Ultrasound was used today secondary to the patient having tightened structures limiting joint motion that require an increase in extensibility. Ultrasound was used today to reduce pain and reduce spasm. HEP: As above; handouts given to patient for all exercises. Treatment/Session Summary:    · Response to Treatment:  Pt. continues to need coaching to perform exercises correctly. Pt  Continues to cry during session randomly followed by laughter. Pt. Goes for endoscope and colonoscopy tomorrow. · Baseline vitals (5/13/2019): BP: 165/70, HR: 59, SpO: 96%  · Communication/Consultation:  Patient and her  were present and educated proper technique and body mechanics with each exercise. · Equipment provided today:  None today  · Recommendations/Intent for next treatment session: Next visit will focus on strengthening, ambulation with straight cane, balance, and stretching/ROM for lumbar spine.   Treatment Plan of Care Effective Dates: 5/13/2019 to 6/24/2019    Total Treatment Billable Duration:  55 mins   PT Patient Time In/Time Out  Time In: 0900  Time Out: 1000  Sejal Force, PTA

## 2019-06-07 ENCOUNTER — ANESTHESIA (OUTPATIENT)
Dept: ENDOSCOPY | Age: 64
End: 2019-06-07
Payer: MEDICARE

## 2019-06-07 ENCOUNTER — HOSPITAL ENCOUNTER (OUTPATIENT)
Age: 64
Setting detail: OUTPATIENT SURGERY
Discharge: HOME | End: 2019-06-07
Attending: INTERNAL MEDICINE | Admitting: INTERNAL MEDICINE
Payer: MEDICARE

## 2019-06-07 ENCOUNTER — ANESTHESIA EVENT (OUTPATIENT)
Dept: ENDOSCOPY | Age: 64
End: 2019-06-07
Payer: MEDICARE

## 2019-06-07 VITALS
BODY MASS INDEX: 28.53 KG/M2 | WEIGHT: 161 LBS | HEART RATE: 55 BPM | TEMPERATURE: 97.6 F | SYSTOLIC BLOOD PRESSURE: 150 MMHG | HEIGHT: 63 IN | DIASTOLIC BLOOD PRESSURE: 74 MMHG | RESPIRATION RATE: 16 BRPM | OXYGEN SATURATION: 97 %

## 2019-06-07 PROCEDURE — 88305 TISSUE EXAM BY PATHOLOGIST: CPT

## 2019-06-07 PROCEDURE — 76040000025: Performed by: INTERNAL MEDICINE

## 2019-06-07 PROCEDURE — 74011250636 HC RX REV CODE- 250/636

## 2019-06-07 PROCEDURE — 77030021593 HC FCPS BIOP ENDOSC BSC -A: Performed by: INTERNAL MEDICINE

## 2019-06-07 PROCEDURE — 76060000032 HC ANESTHESIA 0.5 TO 1 HR: Performed by: INTERNAL MEDICINE

## 2019-06-07 PROCEDURE — 74011250636 HC RX REV CODE- 250/636: Performed by: INTERNAL MEDICINE

## 2019-06-07 RX ORDER — OXYCODONE AND ACETAMINOPHEN 10; 325 MG/1; MG/1
1 TABLET ORAL AS NEEDED
Status: CANCELLED | OUTPATIENT
Start: 2019-06-07

## 2019-06-07 RX ORDER — SODIUM CHLORIDE 0.9 % (FLUSH) 0.9 %
5-40 SYRINGE (ML) INJECTION AS NEEDED
Status: CANCELLED | OUTPATIENT
Start: 2019-06-07

## 2019-06-07 RX ORDER — TRAMADOL HYDROCHLORIDE 50 MG/1
50 TABLET ORAL
COMMUNITY
End: 2019-09-11

## 2019-06-07 RX ORDER — SODIUM CHLORIDE 0.9 % (FLUSH) 0.9 %
5-40 SYRINGE (ML) INJECTION EVERY 8 HOURS
Status: CANCELLED | OUTPATIENT
Start: 2019-06-07

## 2019-06-07 RX ORDER — PROPOFOL 10 MG/ML
INJECTION, EMULSION INTRAVENOUS AS NEEDED
Status: DISCONTINUED | OUTPATIENT
Start: 2019-06-07 | End: 2019-06-07 | Stop reason: HOSPADM

## 2019-06-07 RX ORDER — PROPOFOL 10 MG/ML
INJECTION, EMULSION INTRAVENOUS
Status: DISCONTINUED | OUTPATIENT
Start: 2019-06-07 | End: 2019-06-07 | Stop reason: HOSPADM

## 2019-06-07 RX ORDER — LIDOCAINE HYDROCHLORIDE 20 MG/ML
INJECTION, SOLUTION EPIDURAL; INFILTRATION; INTRACAUDAL; PERINEURAL AS NEEDED
Status: DISCONTINUED | OUTPATIENT
Start: 2019-06-07 | End: 2019-06-07 | Stop reason: HOSPADM

## 2019-06-07 RX ORDER — SODIUM CHLORIDE, SODIUM LACTATE, POTASSIUM CHLORIDE, CALCIUM CHLORIDE 600; 310; 30; 20 MG/100ML; MG/100ML; MG/100ML; MG/100ML
1000 INJECTION, SOLUTION INTRAVENOUS CONTINUOUS
Status: DISCONTINUED | OUTPATIENT
Start: 2019-06-07 | End: 2019-06-07 | Stop reason: HOSPADM

## 2019-06-07 RX ORDER — OXYCODONE HYDROCHLORIDE 5 MG/1
5 TABLET ORAL
Status: CANCELLED | OUTPATIENT
Start: 2019-06-07 | End: 2019-06-08

## 2019-06-07 RX ORDER — HYDROMORPHONE HYDROCHLORIDE 2 MG/ML
0.5 INJECTION, SOLUTION INTRAMUSCULAR; INTRAVENOUS; SUBCUTANEOUS
Status: CANCELLED | OUTPATIENT
Start: 2019-06-07

## 2019-06-07 RX ADMIN — LIDOCAINE HYDROCHLORIDE 60 MG: 20 INJECTION, SOLUTION EPIDURAL; INFILTRATION; INTRACAUDAL; PERINEURAL at 14:24

## 2019-06-07 RX ADMIN — PROPOFOL 200 MCG/KG/MIN: 10 INJECTION, EMULSION INTRAVENOUS at 14:24

## 2019-06-07 RX ADMIN — PROPOFOL 50 MG: 10 INJECTION, EMULSION INTRAVENOUS at 14:24

## 2019-06-07 RX ADMIN — SODIUM CHLORIDE, SODIUM LACTATE, POTASSIUM CHLORIDE, AND CALCIUM CHLORIDE 1000 ML: 600; 310; 30; 20 INJECTION, SOLUTION INTRAVENOUS at 13:50

## 2019-06-07 NOTE — DISCHARGE INSTRUCTIONS
Gastrointestinal Esophagogastroduodenoscopy (EGD) - Upper Exam Discharge Instructions    1. Call Dr. Courtney Jacobson at [phone] for any problems or questions. 2. Contact the doctor's office for follow up appointment as directed. 3. Medication may cause drowsiness for several hours, therefore:  · Do not drive or operate machinery for remainder of the day. · No alcohol today. · Do not make any important or legal decisions for 24 hours. · Do not sign any legal documents for 24 hours. 5. Ordinarily, you may resume regular diet and activity after exam unless otherwise specified by your physician. 6. For mild soreness in your throat you may use Cepacol throat lozenges or warm salt-water gargles as needed. Any additional instructions:     - Proceed with capsule endoscopy. - Office visit in 2 months. - Colonoscopy in 10 years. Instructions given to Henry Moise and other family members. Gastrointestinal Colonoscopy/Flexible Sigmoidoscopy - Lower Exam Discharge Instructions  1. Call Dr. Courtney Jacobson at [facility] for any problems or questions. 2. Contact the doctors office for follow up appointment as directed  3. Medication may cause drowsiness for several hours, therefore:  · Do not drive or operate machinery for reminder of the day. · No alcohol today. · Do not make any important or legal decisions for 24 hours. · Do not sign any legal documents for 24 hours. 4. Ordinarily, you may resume regular diet and activity after exam unless otherwise specified by your physician. 5. Because of air put into your colon during exam, you may experience some abdominal distension, relieved by the passage of gas, for several hours. 6. Contact your physician if you have any of the following:  · Excessive amount of bleeding - large amount when having a bowel movement. Occasional specks of blood with bowel movement would not be unusual.  · Severe abdominal pain  · Fever or Chills  7.  Polyp Removal - follow these additional instructions  · Clear liquid diet for the next meal (jell-o, broth, clear drinks)  · Soft diet for 24 hours, then resume regular diet   · Take Metamucil - 1 tablespoon in juice every morning for 3 days  · No Aspirin, Advil, Aleve, Nuprin, Ibuprofen, or medications that contain these drugs for 2 weeks. Any additional instructions:     - Colonoscopy in 10 years. Instructions given to Jorge L Delgadillo and other family members.

## 2019-06-07 NOTE — PROCEDURES
DATE OF PROCEDURE: 6/7/19    REQUESTING PHYSICIAN: Dr Jossy King: Esophagogastroduodenoscopy. ENDOSCOPIST: Sally Orozco M.D. PREOPERATIVE DIAGNOSIS: PJ, Hx of cirrhosis/portal hypertension    POSTOPERATIVE DIAGNOSIS: Duodenitis, Portal hypertensive gastropathy, Hiatal hernia    INSTRUMENTS: GIF H190    SEDATION: MAC    DESCRIPTION: After informed consent was obtained, the patient was taken to the endoscopy suite and placed in the left lateral decubitus position. Intravenous sedation was administered as above and posterior pharynx was anesthetized with local anesthetic spray. After adequate sedation had been achieved the endoscope was inserted over the tongue and through the posterior pharynx under direct visualization down the esophagus to the stomach and into the second portion of the duodenum. The endoscopic was withdrawn from that point, performing a careful survey of the mucosa. Retroflexion was performed in the gastric fundus. FINDINGS:  Esophagus: Normal appearing mucosa with lots of adherent white plaques concerning for Candida- bx's. No varices seen. Small hiatal hernia. Stomach: Very mild changes of portal hypertensive gastropathy in the proximal stomach. No friability or ulcers. Duodenum: Mild duodenitis in the bulb. Bx's.      Estimated blood loss:  0 cc-minimal    IMPRESSION:   Duodenitis  Hiatal hernia  PHG    PLAN:  - F/u path  - Proceed to colo    P Tiffani Mejia MD

## 2019-06-07 NOTE — ANESTHESIA PREPROCEDURE EVALUATION
Relevant Problems   No relevant active problems       Anesthetic History               Review of Systems / Medical History  Patient summary reviewed and pertinent labs reviewed    Pulmonary    COPD: moderate      Smoker         Neuro/Psych              Cardiovascular    Hypertension                Comments: Echo preserved LV fx 2018   GI/Hepatic/Renal     GERD      Liver disease    Comments: SHIPLEY syndrome Endo/Other      Hypothyroidism  Arthritis     Other Findings            Physical Exam    Airway  Mallampati: II  TM Distance: > 6 cm  Neck ROM: normal range of motion   Mouth opening: Normal     Cardiovascular    Rhythm: regular           Dental    Dentition: Full upper dentures     Pulmonary                 Abdominal  GI exam deferred       Other Findings            Anesthetic Plan    ASA: 3  Anesthesia type: total IV anesthesia          Induction: Intravenous  Anesthetic plan and risks discussed with: Patient

## 2019-06-07 NOTE — ANESTHESIA POSTPROCEDURE EVALUATION
Procedure(s):  ESOPHAGOGASTRODUODENOSCOPY (EGD)  COLONOSCOPY/ 30  ESOPHAGOGASTRODUODENAL (EGD) BIOPSY. total IV anesthesia    Anesthesia Post Evaluation      Multimodal analgesia: multimodal analgesia used between 6 hours prior to anesthesia start to PACU discharge  Patient location during evaluation: PACU  Patient participation: complete - patient participated  Level of consciousness: responsive to verbal stimuli and awake  Pain management: adequate  Airway patency: patent  Anesthetic complications: no  Cardiovascular status: acceptable  Respiratory status: acceptable, spontaneous ventilation and nonlabored ventilation  Hydration status: acceptable  Post anesthesia nausea and vomiting:  none      Vitals Value Taken Time   /81 6/7/2019  3:08 PM   Temp     Pulse 53 6/7/2019  3:12 PM   Resp     SpO2 96 % 6/7/2019  3:12 PM   Vitals shown include unvalidated device data. ~ PLEASE BRING IN ALL OF YOUR MEDICINE BOTTLES WITH YOU TO EVERY VISIT ~    Recommend you seeing a cardiologist to follow up on your pericardial effusion with [facility].     Return for a PAP visit.     ++++++++++++++++++++++++++++++++++++++++++++++++++++++++++++++++++++++  Your medication list is printed, please keep this with you, it is helpful to bring this current list to any other medical appointments, the emergency room or hospital.    If you had lab testing today and your results are reassuring or normal they will be be mailed to you within 7 days.     If the lab tests need quick action we will call you with the results.   The phone number we will call with results is # [phone] (home) . If this is not the best number please call our clinic and change the number.    If you need any refills please call your pharmacy and they will contact us.    If you have any further concerns or wish to schedule another appointment you must call our office during normal business hours  [phone] (8-5:00 M-F)  If you have urgent medical questions that cannot wait  you may also call [phone] at any time of day.  If you have a medical emergency please call [phone].    Thank you for coming to [facility].      Referral for ( TEST )  :      Cardiology   LOCATION/PLACE/Provider :    [place] [id number] [facility], [address] [place] [address]  DATE & TIME :     11- at 10:50am  PHONE :     [phone]  FAX :     [phone]  ADDITIONAL INFORMATION :     NA  Appointment made by clinic staff/:    Sulema

## 2019-06-07 NOTE — H&P
History and Physical      Patient: Max Valero    Physician: Jose M Kingston MD    Referring Physician: Valarie Hermosillo MD    Chief Complaint: For colonoscopy and EGD    History of Present Illness: Pt presents for colonoscopy and EGD. Pt with cirrhosis and recent hospitalization for PJ (Hgb of 5). EGD/[place] in 2017 unremarkable. History:  Past Medical History:   Diagnosis Date    Chronic obstructive pulmonary disease (HCC)     daily inhalers    Depression     Gallbladder cancer (HCC)     GERD (gastroesophageal reflux disease)     daily meds    Hypothyroidism     Lupus (HCC)     Lupus (HCC)     Memory loss     OA (osteoarthritis)     Prediabetes     oral agents: doesn't check glucose at home     Past Surgical History:   Procedure Laterality Date    COLONOSCOPY N/A 11/9/2017    COLONOSCOPY  BMI 35 performed by Jose M Kingston MD at [facility]    HX CHOLECYSTECTOMY      HX HYSTERECTOMY  1977    HX OTHER SURGICAL      liver bx    HX OTHER SURGICAL      wound debridement      Social History     Socioeconomic History    Marital status:      Spouse name: Not on file    Number of children: Not on file    Years of education: Not on file    Highest education level: Not on file   Tobacco Use    Smoking status: Current Some Day Smoker     Packs/day: 1.50     Years: 45.00     Pack years: 67.50    Smokeless tobacco: Never Used    Tobacco comment: Pt. taking chantix   Substance and Sexual Activity    Alcohol use: No    Drug use: Yes     Types: Marijuana      No family history on file. Medications:   Prior to Admission medications    Medication Sig Start Date End Date Taking? Authorizing Provider   amLODIPine (NORVASC) 5 mg tablet Take 2 Tabs by mouth daily. 7/17/18   Kay Greer MD   fluticasone-umeclidin-vilanter (TRELEGY ELLIPTA) 100-62.5-25 mcg dsdv Take 1 Puff by inhalation daily.     Provider, Historical   varenicline (CHANTIX) 1 mg tablet Take 1 mg by mouth two (2) times daily (after meals). Provider, Historical   busPIRone (BUSPAR) 15 mg tablet Take 15 mg by mouth three (3) times daily. Takes PRN only    Provider, Historical   docusate sodium (STOOL SOFTENER) 100 mg tab Take  by mouth. Provider, Historical   baclofen (LIORESAL) 10 mg tablet Take  by mouth three (3) times daily. Provider, Historical   B.infantis-B.ani-B.long-B.bifi (PROBIOTIC 4X) 10-15 mg TbEC Take  by mouth. Provider, Historical   LINACLOTIDE (LINZESS PO) Take  by mouth. Provider, Historical   donepezil (ARICEPT) 10 mg tablet Take 10 mg by mouth nightly. Provider, Historical   dicyclomine (BENTYL) 10 mg capsule Take 10 mg by mouth daily. Provider, Historical   Omeprazole delayed release (PRILOSEC D/R) 20 mg tablet Take 20 mg by mouth every morning. Provider, Historical   meloxicam (MOBIC) 15 mg tablet Take 15 mg by mouth daily. Provider, Historical   atorvastatin (LIPITOR) 10 mg tablet Take 10 mg by mouth nightly. Provider, Historical   levothyroxine (SYNTHROID) 50 mcg tablet Take 50 mcg by mouth Daily (before breakfast). Provider, Historical   vilazodone (VIIBRYD) 40 mg tab tablet Take 40 mg by mouth every morning. Provider, Historical   gabapentin (NEURONTIN) 300 mg capsule Take 300 mg by mouth nightly. Provider, Historical   ursodiol (ACTIGALL) 500 mg tablet Take 250 mg by mouth three (3) times daily. Provider, Historical   lisinopril (PRINIVIL, ZESTRIL) 20 mg tablet Take 20 mg by mouth daily. Provider, Historical   multivitamin (ONE A DAY) tablet Take 1 Tab by mouth daily. Provider, Historical   aspirin delayed-release 81 mg tablet Take 81 mg by mouth daily. Provider, Historical   albuterol (PROAIR HFA) 90 mcg/actuation inhaler Take  by inhalation as needed for Wheezing. Provider, Historical       Allergies:    Allergies   Allergen Reactions    Adhesive Rash     From nicotine patch    Codeine Other (comments)     \"nightmares, I hear things\"       Physical Exam:     Vital Signs: There were no vitals taken for this visit. .    General: no distress      Heart: regular   Lungs: unlabored   Abdominal: soft   Neurological: Grossly normal        Findings/Diagnosis: PJ    Plan of Care/Planned Procedure: Colonoscopy, possible polypectomy and EGD. Pt/designee has reviewed the colonoscopy information sheet. Any questions have been discussed. They agree to proceed.       Signed:  William Esquivel MD   6/7/2019

## 2019-06-07 NOTE — PROCEDURES
DATE OF PROCEDURE: 6/7/19    REQUESTING PHYSICIAN: Dr Tellez Pretty: Colonoscopy. ENDOSCOPIST: Pastor Cy M.D. PREOPERATIVE DIAGNOSIS: PJ    POSTOPERATIVE DIAGNOSIS: Internal hemorrhoids     SEDATION: MAC    INSTRUMENT: CF H190    DESCRIPTION OF PROCEDURE:  After informed consent was obtained the patient was placed in the left lateral decubitus position. Intravenous sedation was administered as above. After adequate sedation had been achieved, digital rectal examination was performed. The colonoscope was then inserted through the anus and followed the course of the rectum and colon to the cecum, which was confirmed by visualization of the ileocecal valve and appendiceal orifice. The colonoscope was withdrawn from that point, performing a careful survey of the mucosa. Retroflexion was performed in the rectum. FINDINGS:  Normal appearing colonic mucosa from rectum to cecum without inflammation. No polyps, masses, ulcers, or AVMs seen. The TI was intubated and normal. Mild internal hemorrhoid seen on retroflexion.      Estimated Blood Loss:  0 cc-min    IMPRESSION:  Internal hemorrhoids  No etiology found for PJ    PLAN:  - Proceed with capsule endoscopy  - OV in 2mos  - Tracy in 10yrs    AMY Jean Baptiste MD

## 2019-06-10 ENCOUNTER — HOSPITAL ENCOUNTER (OUTPATIENT)
Dept: PHYSICAL THERAPY | Age: 64
Discharge: HOME | End: 2019-06-10
Payer: MEDICARE

## 2019-06-10 PROCEDURE — 97110 THERAPEUTIC EXERCISES: CPT

## 2019-06-10 NOTE — PROGRESS NOTES
Darylene Hof  : [birth year]  Primary: [place]  Secondary:  [address] at [address], [address]  [phone]   [phone]      OUTPATIENT PHYSICAL THERAPY: Daily Treatment Note 6/10/2019    Pre-treatment Symptoms/Complaints:  Patient reported minimal pain 3/10. Pt. Stated her colonoscopy and endoscopy went well. Pt. And her  reported having an active weekend. Pain: Initial: Pain Intensity 1: 3  Pain Location 1: Back  Pain Orientation 1: Left, Right  Post Session:  2./10 less pain \"felt great\"   Medications Last Reviewed:  6/10/2019  REFERRING PHYSICIAN: Lillian Henderson MD  RETURN PHYSICIAN APPOINTMENT: not scheduled    Precautions: hypertension, history of falls, history of syncope (CHECK VITALS)    Date of Onset: 2019 when patient fell/had a syncopic episode suddenly at home and then sustained foot drop a few days later    Updated Objective Findings:  Pt. ambulated with minimal antalgic gait pattern. TREATMENT:   THERAPEUTIC EXERCISE: (55 minutes):  Exercises per grid below to improve mobility, strength, balance and coordination. Required minimal visual, verbal and manual cues to promote proper body alignment, promote proper body posture and promote proper body mechanics. Progressed resistance, range, repetitions and complexity of movement as indicated.      Date:  19 Date:  6/10/19 Date:  19   Activity/Exercise Parameters Parameters Parameters   nustep  6 minutes, level 4 Level 1 x 10 mins  Level 3 x 12 mins    Angle board 4 x 30 sec no shoes or brace 4 x 30 sec no shoes or brace 4x30 sec hold no brace or shoes   Single knee to chest 4x30  4 x30 sec hold BLE's  4x30 sec hold    Double knee to chest 4x30 sec hold  4x30 sec hold  4x30 sec hold    Hamstring stretch 4 x 30 sec hold strap 4x30 sec hold BLE's  4x30 sec hold BLE's    Piriformis stretch - knee only 4x30 sec hold  4x30 sec hold  4x30 sec hold    Band plantarflexion Green 2x10  Green 2x10 reps  Green band 2x10 reps    3 way band Red 2  x 10 with active assisted dorsiflexion and eversion Green band 2x10 reps active assisted dorsiflexion and eversion. Red band x 20 reps each direction assistance with dorsiflexion & eversion   Seated dorsiflexion actively - keep foot as plantarflexed as possible to improved range of active dorsiflexion in sitting 3 x 10 bilaterally  3 x 10 bilaterally  2x10 bilaterally          MANUAL THERAPY: (0 minutes): none today was utilized and necessary because of the patient's none today   None today patient declined    MODALITIES: (0 minutes): *  Ultrasound was used today secondary to the patient having tightened structures limiting joint motion that require an increase in extensibility. Ultrasound was used today to reduce pain and reduce spasm. HEP: As above; handouts given to patient for all exercises. Treatment/Session Summary:    · Response to Treatment:  Pt. was compliant with all exercises and had no complaints of back pack or crying today. · Baseline vitals (5/13/2019): BP: 165/70, HR: 59, SpO: 96%  · Communication/Consultation:  Patient and her  were present and educated proper technique and body mechanics with each exercise. · Equipment provided today:  None today  · Recommendations/Intent for next treatment session: Next visit will focus on strengthening,  balance, and stretching/ROM for lumbar spine and lower extremities.    Treatment Plan of Care Effective Dates: 5/13/2019 to 6/24/2019    Total Treatment Billable Duration:  55 mins   PT Patient Time In/Time Out  Time In: 0800  Time Out: 0905  Juliana Alvares PTA

## 2019-06-13 ENCOUNTER — HOSPITAL ENCOUNTER (OUTPATIENT)
Dept: PHYSICAL THERAPY | Age: 64
Discharge: HOME | End: 2019-06-13
Payer: MEDICARE

## 2019-06-13 PROCEDURE — 97110 THERAPEUTIC EXERCISES: CPT

## 2019-06-13 NOTE — PROGRESS NOTES
Mckinley Wallace  :   [place]: [place]  Secondary:  [address] at [address], [address]  [phone]   [phone]      OUTPATIENT PHYSICAL THERAPY: Daily Treatment Note 2019    Pre-treatment Symptoms/Complaints:  Patient reported improvement since the start of therapy and feels ready for discharge today. The injections for the back have improved. Pain: Initial: Pain Intensity 1: 4  Pain Location 1: Back  Pain Orientation 1: Left, Right  Post Session:  2./10 less pain \"felt great\"   Medications Last Reviewed:  2019  REFERRING PHYSICIAN: Andria Vernon MD  RETURN PHYSICIAN APPOINTMENT: not scheduled    Precautions: hypertension, history of falls, history of syncope (CHECK VITALS)    Date of Onset: 2019 when patient fell/had a syncopic episode suddenly at home and then sustained foot drop a few days later    Updated Objective Findings:  see discharge note from today     TREATMENT:   THERAPEUTIC EXERCISE: (55 minutes):  Exercises per grid below to improve mobility, strength, balance and coordination. Required minimal visual, verbal and manual cues to promote proper body alignment, promote proper body posture and promote proper body mechanics. Progressed resistance, range, repetitions and complexity of movement as indicated.      Date:  19 Date:  6/10/19 Date:  19   Activity/Exercise Parameters Parameters Parameters   nustep  6 minutes, level 4 Level 1 x 10 mins  Level 3 x 12 mins    Angle board 4 x 30 sec no shoes or brace 4 x 30 sec no shoes or brace 4x30 sec hold no brace or shoes   Single knee to chest 4x30  4 x30 sec hold BLE's  3 x 30 sec bilateral   Double knee to chest 4x30 sec hold  4x30 sec hold  3 x 30 sec   Hamstring stretch 4 x 30 sec hold strap 4x30 sec hold BLE's  3 x 30 sec    Piriformis stretch - knee only 4x30 sec hold  4x30 sec hold  3 x 30 sec   Band plantarflexion Green 2x10 Green 2x10 reps  Black band 3x10 reps    3 way band Red 2  x 10 with active assisted dorsiflexion and eversion Green band 2x10 reps active assisted dorsiflexion and eversion. Red band x 20 reps each direction assistance with dorsiflexion & eversion   Seated dorsiflexion actively - keep foot as plantarflexed as possible to improved range of active dorsiflexion in sitting 3 x 10 bilaterally  3 x 10 bilaterally  2x10 bilaterally    Standing plantarflexion --- --- 3 x 10         MANUAL THERAPY: (0 minutes): none today was utilized and necessary because of the patient's none today   None today patient declined    MODALITIES: (0 minutes): *  Ultrasound was used today secondary to the patient having tightened structures limiting joint motion that require an increase in extensibility. Ultrasound was used today to reduce pain and reduce spasm. HEP: As above; handouts given to patient for all exercises. Treatment/Session Summary:    · Response to Treatment:  Patien tolerated session well and is discharged at this time. · Baseline vitals (5/13/2019): BP: 165/70, HR: 59, SpO: 96%  · Communication/Consultation:  Patient and her  were present and educated proper technique and body mechanics with each exercise. · Equipment provided today:  None today  · Recommendations/Intent for next treatment session: patient is discharged at this time.   Treatment Plan of Care Effective Dates: 5/13/2019 to 6/24/2019    Total Treatment Billable Duration:  55 mins   PT Patient Time In/Time Out  Time In: 0900  Time Out: [address], PT

## 2019-06-13 NOTE — THERAPY DISCHARGE
Augusta Monsalve : 1955 Primary: [place] [employer] [place] Secondary:  [facility] at [address], [place], [address] [place] [address] Street [phone]   [phone] OUTPATIENT PHYSICAL THERAPY:Discharge 2019 ICD-10: Treatment Diagnosis: lesion of sciatic nerve, right lower limp (G57.01) Treatment Diagnosis 2: low back pain (M54.5) Treatment Diagnosis 3: gait dysfunction (R26.89) Precautions: hypertension, history of falls, history of syncope Allergies: Adhesive and Codeine MD Orders: Evaluate and Treat  MEDICAL/REFERRING DIAGNOSIS: 
Lesion of sciatic nerve, right lower limb [G57.01] DATE OF ONSET: 2019 when patient fell/had a syncopic episode suddenly at home and then sustained foot drop a few days later REFERRING PHYSICIAN: Yo Webb MD 
RETURN PHYSICIAN APPOINTMENT: not scheduled INITIAL ASSESSMENT:  Ms. Elise Kwon is a 71 y.o. female presenting to physical therapy with complaints of low back pain, right leg and ankle pain, and right foot drop that started after falling/experiencing a syncopic episode in 2019. She reported going to the ER and they reported the likely cause for the episode was medication, but then she noticed a few days later that she was unable to feet her foot or lift it independently or with gait. She has undergone full examination with Dr Jane Jansen who diagnosed her with arthritis and lesion of the L4, L5, and S1 nerve roots on the right. She is not a surgical cadidate, and has been recommended to undergo epidural injections of the lumbar spine. She is eager to improve strength of the foot and ankle, return to work as a  at [employer], and improve independence overall. Patient has been seen for 9 sessions of therapy from 2019 to 2019 with some success.   She reports much less pain in the back since injections and is experiencing some improvements in foot strength. She has met most preset goals, is independent with HEP, and is discharged at this time. PROBLEM LIST (Impacting functional limitations): 1. Decreased Strength 2. Decreased ADL/Functional Activities 3. Decreased Transfer Abilities 4. Decreased Ambulation Ability/Technique 5. Decreased Balance 6. Increased Pain 7. Decreased Activity Tolerance 8. Decreased Pacing Skills 9. Increased Fatigue 10. Increased Shortness of Breath 11. Decreased Flexibility/Joint Mobility INTERVENTIONS PLANNED: (Treatment may consist of any combination of the following) 1. Balance Exercise 2. Cold 3. Cryotherapy 4. Electrical Stimulation 5. Family Education 6. Gait Training 7. Heat 8. Home Exercise Program (HEP) 9. Manual Therapy 10. Neuromuscular Re-education/Strengthening 11. Range of Motion (ROM) 12. Therapeutic Exercise/Strengthening 13. Transcutaneous Electrical Nerve Stimulation (TENS) 14. Transfer Training 15. Ultrasound (US)  
TREATMENT PLAN: 
Effective Dates: 5/13/2019 TO 6/24/2019 Frequency/Duration: Patient has been seen for 9 sessions of therapy from 5/13/2019 to 6/13/2019 with some success. She reports much less pain in the back since injections and is experiencing some improvements in foot strength. She has met most preset goals and is discharged at this time. GOALS: (Goals have been discussed and agreed upon with patient.) Short-Term Functional Goals: Time Frame: 5/13/2019 to 6/3/2019 1. Patient demonstrates independence with home exercise program without verbal cueing provided by therapist. - GOAL MET 2. Improve seated posture with decreased forward head, forward shoulders, and thoracic kyphosis without cuing. - GOAL MET 3. Improve flexibility of gastrocnemius and hamstrings with SLR to 90 degrees. - GOAL MET Discharge Goals: Time Frame: 5/13/2019 to 6/24/2019 1. Improve pain to 5/10 at the most with standing, walking, sleeping, and exercising for health. - GOAL MET 2. Improve strength of dorsiflexors, ankle eversion, and plantarflexion by at least 1 full muscle grade. - GOAL MET 3. Improve gait with and without use of straight cane with appropriate technique, independence, and safety. - GOAL MET 4. Improve low back symptoms with performance of HEP and postural awareness. - GOAL MET 5. 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 - GOAL MET 6. Improve Oswestry Low Back Index score to 10/50 from 15/50. - NOT MET Outcome Measure: Tool Used: Modified Oswestry Low Back Pain Questionnaire Score:  Initial: 15/50  Most Recent: 17/50 (Date: 6/13/2019) Interpretation of Score: Each section is scored on a 0-5 scale, 5 representing the greatest disability. The scores of each section are added together for a total score of 50. OBJECTIVE MEASUREMENTS: 
Observation/Postural and Gait Assessment: Patient ambulates with surface orientation and contact guard assistance from her . She has been ambulating safely and independently without assistive device (from been advised to utilize a straight cane for ambulation to ensure safety and reduce falls risk). Patient is visibly atrophied of right calf and ambulates with AFO to control foot drop and steppage gait with ambulation. Patient sits with minimal (From moderate) forward head, forward shoulders, and thoracic kyphosis. Palpation: Gross tenderness to palpation of lumbar paraspinals. AROM:  
Lumbar extension: 15° with pain Lumbar flexion: 60° with pain Lumbar left side bend: 15° Lumbar right side bend: 15° AROM (PROM) Left Right Hip flexion 120° 120° Hip extension At least to table At least to table Hip external rotation (ER) 45° 45° Hip internal rotation (IR) 30° 30° Strength: 
 Manual Muscle Test (out of 5) Left Right Knee extension 5 5 Knee flexion 5 5 Hip flexion 4 4 Hip extension 3 3 Hip abduction 3 3 Ankle inversion 5 5 (From 4+) Ankle eversion 5 3+ (From 2 visible contraction) Great toe extension 5 3 (from 2) Ankle DF 5 1 (visible contraction with no movement gravity reduced) Ankle PF 5 5 (From 4+ and able to heel raise) Gross abdominal strength 3/5 as observed with transfers Medical Necessity:  
Patient is discharged at this time. Rehabilitation Potential For Stated Goals: Good Regarding Stacy Yoo's therapy, I certify that the treatment plan above will be carried out by a therapist or under their direction. Thank you for this referral, Gladis Maki, PT

## 2019-09-11 PROBLEM — F41.9 ANXIETY: Status: ACTIVE | Noted: 2019-09-11

## 2019-09-11 PROBLEM — M32.9 LUPUS (HCC): Status: ACTIVE | Noted: 2019-09-11

## 2020-01-14 PROBLEM — R53.82 CHRONIC FATIGUE: Status: ACTIVE | Noted: 2020-01-14

## 2020-06-25 ENCOUNTER — HOSPITAL ENCOUNTER (OUTPATIENT)
Dept: MAMMOGRAPHY | Age: 65
Discharge: HOME | End: 2020-06-25
Attending: FAMILY MEDICINE

## 2020-06-25 DIAGNOSIS — Z12.39 SCREENING FOR MALIGNANT NEOPLASM OF BREAST: ICD-10-CM

## 2020-08-12 NOTE — PROGRESS NOTES
4 Eyes Skin Assessment     The patient is being assess for   Admission    I agree that 2 RN's have performed a thorough Head to Toe Skin Assessment on the patient. ALL assessment sites listed below have been assessed. Areas assessed by both nurses:   [x]   Head, Face, and Ears   []   Shoulders, Back, and Chest, Abdomen  [x]   Arms, Elbows, and Hands   []   Coccyx, Sacrum, and Ischium  [x]   Legs, Feet, and Heels        Refused skin check. Scattered bruises noted. Healing cellulitis to RLE.    **SHARE this note so that the co-signing nurse is able to place an eSignature**    Co-signer eSignature: {Esignature:[id number]}    Does the Patient have Skin Breakdown?   No          Aldo Prevention initiated:  No   Wound Care Orders initiated:  No      WOC nurse consulted for Pressure Injury (Stage 3,4, Unstageable, DTI, NWPT, Complex wounds)and New or Established Ostomies:  No      Primary Nurse eSignature: Electronically signed by Sylvia Spencer RN on 8/12/20 at 5:29 PM EDT David Emmanuel  :   Primary: [place]m*  Secondary:  [address]  at [address]  [address], [place], [address] [place] [address]  [phone]   [phone]      OUTPATIENT PHYSICAL THERAPY: Daily Treatment Note 2019    Pre-treatment Symptoms/Complaints:  Patient reported being most concerned with function of her foot and leg and returning to work as a  at [employer]. Pain: Initial: Pain Intensity 1: 8  Pain Location 1: Back, Leg  Pain Orientation 1: Mid, Right  Post Session:  8/10   Medications Last Reviewed:  2019  REFERRING PHYSICIAN: Val Moore MD  RETURN PHYSICIAN APPOINTMENT: not scheduled    Precautions: hypertension, history of falls, history of syncope (CHECK VITALS)    Date of Onset: 2019 when patient fell/had a syncopic episode suddenly at home and then sustained foot drop a few days later    Updated Objective Findings:  See evaluation note from today   TREATMENT:   THERAPEUTIC EXERCISE: (0 minutes):  Exercises per grid below to improve mobility, strength, balance and coordination. Required minimal visual, verbal and manual cues to promote proper body alignment, promote proper body posture and promote proper body mechanics. Progressed resistance, range, repetitions and complexity of movement as indicated.      Date:  2019 Date:   Date:     Activity/Exercise Parameters Parameters Parameters   Single knee to chest 3 x 30 sec     Double knee to chest 3 x 30 sec     Hamstring stretch 3 x 30 sec strap     Piriformis stretch - knee only 3 x 30 sec     Calf stretch Strap 3 x 30 sec     Band plantarflexion Green 2 x 10     Seated dorsiflexion actively - keep foot as plantarflexed as possible to improved range of active dorsiflexion in sitting 2 x 10 bilaterally         MANUAL THERAPY: (0 minutes): none today was utilized and necessary because of the patient's none today   None today    MODALITIES: (0 minutes): none today     HEP: As above; handouts given to patient for all exercises. Treatment/Session Summary:    · Response to Treatment:  Patient tolerated session well and reported a good understanding of exercises. Was able to dorsiflex slightly in gravity assisted position in prone. Visble contraction noted in sitting from full plantarflexion. Will progress as tolerated with emphasis on strengthening, function, and symptom reduction of the lumbar spine. · Baseline vitals (5/13/2019): BP: 165/70, HR: 59, SpO: 96%  · Communication/Consultation:  Consulted for entire session with patient's  Dana Garibay  · Equipment provided today:  None today  · Recommendations/Intent for next treatment session: Next visit will focus on strengthening, ambulation with straight cane, balance, and stretching/ROM for lumbar spine.   Treatment Plan of Care Effective Dates: 5/13/2019 to 6/24/2019    Total Treatment Billable Duration:  30 minutes: 30 evaluation, no charge for exercises due to insurance today  PT Patient Time In/Time Out  Time In: 1420  Time Out: [address], PT

## 2020-08-23 PROBLEM — N89.8 VAGINAL DRYNESS: Status: ACTIVE | Noted: 2020-08-23

## 2020-08-23 PROBLEM — N95.1 VASOMOTOR SYMPTOMS DUE TO MENOPAUSE: Status: ACTIVE | Noted: 2020-08-23

## 2020-10-22 PROBLEM — F33.2 DEPRESSION, MAJOR, SEVERE RECURRENCE (HCC): Status: ACTIVE | Noted: 2020-10-22

## 2020-12-15 ENCOUNTER — HOSPITAL ENCOUNTER (OUTPATIENT)
Dept: LAB | Age: 65
Discharge: HOME | End: 2020-12-15

## 2020-12-15 PROCEDURE — 88173 CYTOPATH EVAL FNA REPORT: CPT

## 2020-12-15 PROCEDURE — 88305 TISSUE EXAM BY PATHOLOGIST: CPT

## 2020-12-16 ENCOUNTER — HOSPITAL ENCOUNTER (OUTPATIENT)
Dept: LAB | Age: 65
Discharge: HOME | End: 2020-12-16

## 2020-12-16 PROCEDURE — 87205 SMEAR GRAM STAIN: CPT

## 2020-12-17 ENCOUNTER — HOSPITAL ENCOUNTER (OUTPATIENT)
Dept: LAB | Age: 65
Discharge: HOME | End: 2020-12-17

## 2020-12-19 LAB
BACTERIA SPEC CULT: ABNORMAL
BACTERIA SPEC CULT: ABNORMAL
GRAM STN SPEC: ABNORMAL
GRAM STN SPEC: ABNORMAL
SERVICE CMNT-IMP: ABNORMAL

## 2021-02-03 PROBLEM — E04.1 THYROID NODULE: Status: ACTIVE | Noted: 2021-02-03

## 2021-02-03 PROBLEM — R59.0 CERVICAL LYMPHADENOPATHY: Status: ACTIVE | Noted: 2021-02-03

## 2021-03-25 ENCOUNTER — APPOINTMENT (RX ONLY)
Dept: URBAN - METROPOLITAN AREA CLINIC 349 | Facility: CLINIC | Age: 66
Setting detail: DERMATOLOGY
End: 2021-03-25

## 2021-03-25 DIAGNOSIS — L20.89 OTHER ATOPIC DERMATITIS: ICD-10-CM

## 2021-03-25 DIAGNOSIS — L93.0 DISCOID LUPUS ERYTHEMATOSUS: ICD-10-CM

## 2021-03-25 PROBLEM — L30.9 DERMATITIS, UNSPECIFIED: Status: ACTIVE | Noted: 2021-03-25

## 2021-03-25 PROCEDURE — 11104 PUNCH BX SKIN SINGLE LESION: CPT

## 2021-03-25 PROCEDURE — 99204 OFFICE O/P NEW MOD 45 MIN: CPT | Mod: 25

## 2021-03-25 PROCEDURE — ? PRESCRIPTION

## 2021-03-25 PROCEDURE — ? BIOPSY BY PUNCH METHOD

## 2021-03-25 PROCEDURE — ? ORDER TESTS

## 2021-03-25 PROCEDURE — A4550 SURGICAL TRAYS: HCPCS

## 2021-03-25 PROCEDURE — ? COUNSELING

## 2021-03-25 RX ORDER — FLUTICASONE PROPIONATE 0.05 MG/G
OINTMENT TOPICAL
Qty: 1 | Refills: 2 | Status: ERX | COMMUNITY
Start: 2021-03-25

## 2021-03-25 RX ORDER — SULFAMETHOXAZOLE AND TRIMETHOPRIM 800; 160 MG/1; MG/1
TABLET ORAL
Qty: 60 | Refills: 0 | Status: ERX | COMMUNITY
Start: 2021-03-25

## 2021-03-25 RX ORDER — TRIAMCINOLONE ACETONIDE 1 MG/G
CREAM TOPICAL
Qty: 1 | Refills: 3 | Status: ERX | COMMUNITY
Start: 2021-03-25

## 2021-03-25 RX ADMIN — FLUTICASONE PROPIONATE: 0.05 OINTMENT TOPICAL at 00:00

## 2021-03-25 RX ADMIN — SULFAMETHOXAZOLE AND TRIMETHOPRIM: 800; 160 TABLET ORAL at 00:00

## 2021-03-25 RX ADMIN — TRIAMCINOLONE ACETONIDE: 1 CREAM TOPICAL at 00:00

## 2021-03-25 ASSESSMENT — LOCATION DETAILED DESCRIPTION DERM
LOCATION DETAILED: LEFT ANTECUBITAL SKIN
LOCATION DETAILED: RIGHT LATERAL SHOULDER

## 2021-03-25 ASSESSMENT — LOCATION ZONE DERM: LOCATION ZONE: ARM

## 2021-03-25 ASSESSMENT — LOCATION SIMPLE DESCRIPTION DERM
LOCATION SIMPLE: RIGHT SHOULDER
LOCATION SIMPLE: LEFT UPPER ARM

## 2021-03-25 NOTE — PROCEDURE: BIOPSY BY PUNCH METHOD
Validate Triangulation: No
Billing Type: Third-Party Bill
Consent: Written consent was obtained and risks were reviewed including but not limited to scarring, infection, bleeding, scabbing, incomplete removal, nerve damage and allergy to anesthesia.
Additional Anesthesia Volume In Cc (Will Not Render If 0): 0.6
Wound Care: Vaseline
Suture Removal: 14 days
X Size Of Lesion In Cm (Optional): 0
Post-Care Instructions: I reviewed with the patient in detail post-care instructions. Patient is to keep the biopsy site dry overnight, and then apply bacitracin twice daily until healed. Patient may apply hydrogen peroxide soaks to remove any crusting.
Accession #: global
Hemostasis: None
Dressing: bandage
Anesthesia Volume In Cc (Will Not Render If 0): 1
Epidermal Sutures: 4-0 Nylon
Anesthesia Type: 2% lidocaine with epinephrine
Was A Bandage Applied: Yes
Biopsy Type: H and E
Notification Instructions: Patient will be notified of biopsy results. However, patient instructed to call the office if not contacted within 2 weeks.
Detail Level: Detailed
Punch Size In Mm: 4
Information: Selecting Yes will display possible errors in your note based on the variables you have selected. This validation is only offered as a suggestion for you. PLEASE NOTE THAT THE VALIDATION TEXT WILL BE REMOVED WHEN YOU FINALIZE YOUR NOTE. IF YOU WANT TO FAX A PRELIMINARY NOTE YOU WILL NEED TO TOGGLE THIS TO 'NO' IF YOU DO NOT WANT IT IN YOUR FAXED NOTE.

## 2021-04-08 ENCOUNTER — APPOINTMENT (RX ONLY)
Dept: URBAN - METROPOLITAN AREA CLINIC 349 | Facility: CLINIC | Age: 66
Setting detail: DERMATOLOGY
End: 2021-04-08

## 2021-04-08 DIAGNOSIS — L93.0 DISCOID LUPUS ERYTHEMATOSUS: ICD-10-CM

## 2021-04-08 PROCEDURE — ? PRESCRIPTION MEDICATION MANAGEMENT

## 2021-04-08 PROCEDURE — ? COUNSELING

## 2021-04-08 PROCEDURE — ? SUTURE REMOVAL (NO GLOBAL PERIOD)

## 2021-04-08 PROCEDURE — 99213 OFFICE O/P EST LOW 20 MIN: CPT

## 2021-04-08 ASSESSMENT — LOCATION ZONE DERM
LOCATION ZONE: ARM
LOCATION ZONE: TRUNK

## 2021-04-08 ASSESSMENT — LOCATION SIMPLE DESCRIPTION DERM
LOCATION SIMPLE: LEFT UPPER ARM
LOCATION SIMPLE: RIGHT SHOULDER
LOCATION SIMPLE: LEFT UPPER BACK

## 2021-04-08 ASSESSMENT — LOCATION DETAILED DESCRIPTION DERM
LOCATION DETAILED: LEFT MID-UPPER BACK
LOCATION DETAILED: LEFT ANTECUBITAL SKIN
LOCATION DETAILED: RIGHT ANTERIOR SHOULDER

## 2021-06-04 ENCOUNTER — TRANSCRIBE ORDER (OUTPATIENT)
Dept: SCHEDULING | Age: 66
End: 2021-06-04

## 2021-06-04 DIAGNOSIS — Z12.31 SCREENING MAMMOGRAM FOR HIGH-RISK PATIENT: Primary | ICD-10-CM

## 2021-06-29 ENCOUNTER — HOSPITAL ENCOUNTER (OUTPATIENT)
Dept: MAMMOGRAPHY | Age: 66
Discharge: HOME | End: 2021-06-29
Attending: FAMILY MEDICINE

## 2021-06-29 DIAGNOSIS — Z12.31 SCREENING MAMMOGRAM FOR HIGH-RISK PATIENT: ICD-10-CM

## 2021-12-15 PROBLEM — E03.9 PRIMARY HYPOTHYROIDISM: Status: ACTIVE | Noted: 2021-12-15

## 2022-02-17 ENCOUNTER — NURSE TRIAGE (OUTPATIENT)
Dept: OTHER | Facility: CLINIC | Age: 67
End: 2022-02-17

## 2022-02-17 NOTE — TELEPHONE ENCOUNTER
[facility] with Red Flag Complaint. Subjective: Caller states \"I can't eat or drink anything, I am starving\"     Current Symptoms: Ongoing hip pain, weight loss and worsening abdominal pain. Recently started having diarrhea, nausea and vomiting with decreased PO intake. States she is unable to keep anything down. Stopped taking all narcotics Friday 02- due to fear of addiction and drowsiness. Symptoms started Monday. Diffuse abd pain reported with ongoing hip pain, unable to get complete description or specific location of pain as patient just states \"it is all over\". Denies blood in stool or emesis, states \"its all just water or dry heaves now\". Denies CP/difficulty breathing. Last weight per patient was >150lbs' Current weight today 138lbs    Onset: 4 days ago; worsening    Associated Symptoms: reduced appetite    Pain Severity: 11/10 - hip/abdomen     Temperature: Denies    What has been tried: Nothing    LMP: NA Pregnant: NA    Recommended disposition: See in office today. ED if no appointments. Care advice provided, patient verbalizes understanding; denies any other questions or concerns; instructed to call back for any new or worsening symptoms. Patient/Caller agrees with recommended disposition; writer provided warm transfer to [place] at [facility] for appointment scheduling    Attention Provider: Thank you for allowing me to participate in the care of your patient. The patient was connected to triage in response to information provided to the [facility]. Please do not respond through this encounter as the response is not directed to a shared pool.       Reason for Disposition   Age > 60 years    Protocols used: ABDOMINAL PAIN - [facility] - LACHELLE RAMIREZ

## 2022-03-16 ENCOUNTER — HOSPITAL ENCOUNTER (OUTPATIENT)
Dept: LAB | Age: 67
Discharge: HOME | End: 2022-03-16
Payer: COMMERCIAL

## 2022-03-16 DIAGNOSIS — D69.6 THROMBOCYTOPENIA (HCC): ICD-10-CM

## 2022-03-16 DIAGNOSIS — Z86.2 H/O: IRON DEFICIENCY ANEMIA: ICD-10-CM

## 2022-03-16 PROBLEM — E61.1 IRON DEFICIENCY: Status: ACTIVE | Noted: 2022-03-16

## 2022-03-16 LAB
ALBUMIN SERPL-MCNC: 3.4 G/DL (ref 3.2–4.6)
ALBUMIN/GLOB SERPL: 0.9 {RATIO} (ref 1.2–3.5)
ALP SERPL-CCNC: 101 U/L (ref 50–136)
ALT SERPL-CCNC: 17 U/L (ref 12–65)
ANION GAP SERPL CALC-SCNC: 5 MMOL/L (ref 7–16)
AST SERPL-CCNC: 19 U/L (ref 15–37)
BASOPHILS # BLD: 0 K/UL (ref 0–0.2)
BASOPHILS NFR BLD: 1 % (ref 0–2)
BILIRUB SERPL-MCNC: 0.3 MG/DL (ref 0.2–1.1)
BUN SERPL-MCNC: 16 MG/DL (ref 8–23)
CALCIUM SERPL-MCNC: 9.2 MG/DL (ref 8.3–10.4)
CHLORIDE SERPL-SCNC: 110 MMOL/L (ref 98–107)
CO2 SERPL-SCNC: 27 MMOL/L (ref 21–32)
CREAT SERPL-MCNC: 1.1 MG/DL (ref 0.6–1)
DIFFERENTIAL METHOD BLD: ABNORMAL
EOSINOPHIL # BLD: 0.1 K/UL (ref 0–0.8)
EOSINOPHIL NFR BLD: 1 % (ref 0.5–7.8)
ERYTHROCYTE [DISTWIDTH] IN BLOOD BY AUTOMATED COUNT: 16.9 % (ref 11.9–14.6)
FERRITIN SERPL-MCNC: 50 NG/ML (ref 8–388)
GLOBULIN SER CALC-MCNC: 3.8 G/DL (ref 2.3–3.5)
GLUCOSE SERPL-MCNC: 86 MG/DL (ref 65–100)
HCT VFR BLD AUTO: 38.5 % (ref 35.8–46.3)
HGB BLD-MCNC: 12.2 G/DL (ref 11.7–15.4)
IMM GRANULOCYTES # BLD AUTO: 0 K/UL (ref 0–0.5)
IMM GRANULOCYTES NFR BLD AUTO: 0 % (ref 0–5)
IRON SATN MFR SERPL: 9 %
IRON SERPL-MCNC: 30 UG/DL (ref 35–150)
LYMPHOCYTES # BLD: 2.1 K/UL (ref 0.5–4.6)
LYMPHOCYTES NFR BLD: 26 % (ref 13–44)
MCH RBC QN AUTO: 28.3 PG (ref 26.1–32.9)
MCHC RBC AUTO-ENTMCNC: 31.7 G/DL (ref 31.4–35)
MCV RBC AUTO: 89.3 FL (ref 79.6–97.8)
MONOCYTES # BLD: 0.6 K/UL (ref 0.1–1.3)
MONOCYTES NFR BLD: 8 % (ref 4–12)
NEUTS SEG # BLD: 5.1 K/UL (ref 1.7–8.2)
NEUTS SEG NFR BLD: 64 % (ref 43–78)
NRBC # BLD: 0 K/UL (ref 0–0.2)
PLATELET # BLD AUTO: 32 K/UL (ref 150–450)
PLATELET # BLD AUTO: 44 K/UL (ref 150–450)
PMV BLD AUTO: ABNORMAL FL (ref 9.4–12.3)
POTASSIUM SERPL-SCNC: 3.7 MMOL/L (ref 3.5–5.1)
PROT SERPL-MCNC: 7.2 G/DL (ref 6.3–8.2)
RBC # BLD AUTO: 4.31 M/UL (ref 4.05–5.2)
SODIUM SERPL-SCNC: 142 MMOL/L (ref 136–145)
TIBC SERPL-MCNC: 346 UG/DL (ref 250–450)
WBC # BLD AUTO: 8 K/UL (ref 4.3–11.1)

## 2022-03-16 PROCEDURE — 83540 ASSAY OF IRON: CPT

## 2022-03-16 PROCEDURE — 80053 COMPREHEN METABOLIC PANEL: CPT

## 2022-03-16 PROCEDURE — 85025 COMPLETE CBC W/AUTO DIFF WBC: CPT

## 2022-03-16 PROCEDURE — 82728 ASSAY OF FERRITIN: CPT

## 2022-03-16 PROCEDURE — 36415 COLL VENOUS BLD VENIPUNCTURE: CPT

## 2022-03-16 PROCEDURE — 85049 AUTOMATED PLATELET COUNT: CPT

## 2022-03-18 PROBLEM — R53.82 CHRONIC FATIGUE: Status: ACTIVE | Noted: 2020-01-14

## 2022-03-18 PROBLEM — N89.8 VAGINAL DRYNESS: Status: ACTIVE | Noted: 2020-08-23

## 2022-03-18 PROBLEM — M21.371 RIGHT FOOT DROP: Status: ACTIVE | Noted: 2019-04-04

## 2022-03-18 PROBLEM — E03.9 PRIMARY HYPOTHYROIDISM: Status: ACTIVE | Noted: 2021-12-15

## 2022-03-19 PROBLEM — N95.1 VASOMOTOR SYMPTOMS DUE TO MENOPAUSE: Status: ACTIVE | Noted: 2020-08-23

## 2022-03-19 PROBLEM — M51.36 DDD (DEGENERATIVE DISC DISEASE), LUMBAR: Status: ACTIVE | Noted: 2019-04-04

## 2022-03-19 PROBLEM — E04.1 THYROID NODULE: Status: ACTIVE | Noted: 2021-02-03

## 2022-03-19 PROBLEM — Z72.0 TOBACCO ABUSE: Status: ACTIVE | Noted: 2019-04-04

## 2022-03-19 PROBLEM — K74.60 LIVER CIRRHOSIS SECONDARY TO NASH (HCC): Status: ACTIVE | Noted: 2017-12-28

## 2022-03-19 PROBLEM — K75.81 LIVER CIRRHOSIS SECONDARY TO NASH (HCC): Status: ACTIVE | Noted: 2017-12-28

## 2022-03-19 PROBLEM — R59.0 CERVICAL LYMPHADENOPATHY: Status: ACTIVE | Noted: 2021-02-03

## 2022-03-19 PROBLEM — F33.2 DEPRESSION, MAJOR, SEVERE RECURRENCE (HCC): Status: ACTIVE | Noted: 2020-10-22

## 2022-03-19 PROBLEM — F41.9 ANXIETY: Status: ACTIVE | Noted: 2019-09-11

## 2022-03-19 PROBLEM — E78.00 PURE HYPERCHOLESTEROLEMIA: Status: ACTIVE | Noted: 2017-12-28

## 2022-03-20 PROBLEM — R79.89 ELEVATED BRAIN NATRIURETIC PEPTIDE (BNP) LEVEL: Status: ACTIVE | Noted: 2017-12-28

## 2022-03-20 PROBLEM — Z86.2 H/O: IRON DEFICIENCY ANEMIA: Status: ACTIVE | Noted: 2017-12-28

## 2022-03-20 PROBLEM — I10 ESSENTIAL HYPERTENSION: Status: ACTIVE | Noted: 2017-12-28

## 2022-03-20 PROBLEM — M32.9 LUPUS (HCC): Status: ACTIVE | Noted: 2019-09-11

## 2022-03-20 PROBLEM — M48.061 LUMBAR STENOSIS WITHOUT NEUROGENIC CLAUDICATION: Status: ACTIVE | Noted: 2019-04-04

## 2022-03-22 ENCOUNTER — HOSPITAL ENCOUNTER (OUTPATIENT)
Dept: LAB | Age: 67
Discharge: HOME | End: 2022-03-22
Payer: COMMERCIAL

## 2022-03-22 DIAGNOSIS — D69.6 THROMBOCYTOPENIA (HCC): ICD-10-CM

## 2022-03-22 LAB
ABO + RH BLD: NORMAL
ALBUMIN SERPL-MCNC: 3.2 G/DL (ref 3.2–4.6)
ALBUMIN/GLOB SERPL: 0.9 {RATIO} (ref 1.2–3.5)
ALP SERPL-CCNC: 105 U/L (ref 50–136)
ALT SERPL-CCNC: 16 U/L (ref 12–65)
ANION GAP SERPL CALC-SCNC: 8 MMOL/L (ref 7–16)
AST SERPL-CCNC: 14 U/L (ref 15–37)
BASOPHILS # BLD: 0 K/UL (ref 0–0.2)
BASOPHILS NFR BLD: 0 % (ref 0–2)
BILIRUB SERPL-MCNC: 0.2 MG/DL (ref 0.2–1.1)
BLOOD GROUP ANTIBODIES SERPL: NORMAL
BUN SERPL-MCNC: 21 MG/DL (ref 8–23)
CALCIUM SERPL-MCNC: 9.4 MG/DL (ref 8.3–10.4)
CHLORIDE SERPL-SCNC: 107 MMOL/L (ref 98–107)
CO2 SERPL-SCNC: 23 MMOL/L (ref 21–32)
CREAT SERPL-MCNC: 1 MG/DL (ref 0.6–1)
DIFFERENTIAL METHOD BLD: ABNORMAL
EOSINOPHIL # BLD: 0 K/UL (ref 0–0.8)
EOSINOPHIL NFR BLD: 0 % (ref 0.5–7.8)
ERYTHROCYTE [DISTWIDTH] IN BLOOD BY AUTOMATED COUNT: 16.4 % (ref 11.9–14.6)
GLOBULIN SER CALC-MCNC: 3.7 G/DL (ref 2.3–3.5)
GLUCOSE SERPL-MCNC: 194 MG/DL (ref 65–100)
HCT VFR BLD AUTO: 39.7 % (ref 35.8–46.3)
HGB BLD-MCNC: 12.8 G/DL (ref 11.7–15.4)
IMM GRANULOCYTES # BLD AUTO: 0.3 K/UL (ref 0–0.5)
IMM GRANULOCYTES NFR BLD AUTO: 2 % (ref 0–5)
LYMPHOCYTES # BLD: 1.2 K/UL (ref 0.5–4.6)
LYMPHOCYTES NFR BLD: 7 % (ref 13–44)
MCH RBC QN AUTO: 28.1 PG (ref 26.1–32.9)
MCHC RBC AUTO-ENTMCNC: 32.2 G/DL (ref 31.4–35)
MCV RBC AUTO: 87.3 FL (ref 79.6–97.8)
MONOCYTES # BLD: 0.2 K/UL (ref 0.1–1.3)
MONOCYTES NFR BLD: 1 % (ref 4–12)
NEUTS SEG # BLD: 15.3 K/UL (ref 1.7–8.2)
NEUTS SEG NFR BLD: 90 % (ref 43–78)
NRBC # BLD: 0 K/UL (ref 0–0.2)
PLATELET # BLD AUTO: 141 K/UL (ref 150–450)
PMV BLD AUTO: 13.5 FL (ref 9.4–12.3)
POTASSIUM SERPL-SCNC: 4.1 MMOL/L (ref 3.5–5.1)
PROT SERPL-MCNC: 6.9 G/DL (ref 6.3–8.2)
RBC # BLD AUTO: 4.55 M/UL (ref 4.05–5.2)
SODIUM SERPL-SCNC: 138 MMOL/L (ref 136–145)
SPECIMEN EXP DATE BLD: NORMAL
WBC # BLD AUTO: 17.1 K/UL (ref 4.3–11.1)

## 2022-03-22 PROCEDURE — 80053 COMPREHEN METABOLIC PANEL: CPT

## 2022-03-22 PROCEDURE — 36415 COLL VENOUS BLD VENIPUNCTURE: CPT

## 2022-03-22 PROCEDURE — 86900 BLOOD TYPING SEROLOGIC ABO: CPT

## 2022-03-22 PROCEDURE — 85025 COMPLETE CBC W/AUTO DIFF WBC: CPT

## 2022-03-23 ENCOUNTER — HOSPITAL ENCOUNTER (OUTPATIENT)
Dept: INFUSION THERAPY | Age: 67
Discharge: HOME | End: 2022-03-23
Payer: COMMERCIAL

## 2022-03-23 VITALS
SYSTOLIC BLOOD PRESSURE: 127 MMHG | DIASTOLIC BLOOD PRESSURE: 56 MMHG | RESPIRATION RATE: 18 BRPM | HEART RATE: 50 BPM | OXYGEN SATURATION: 99 % | TEMPERATURE: 97.8 F

## 2022-03-23 DIAGNOSIS — E61.1 IRON DEFICIENCY: Primary | ICD-10-CM

## 2022-03-23 LAB — PATH REV BLD -IMP: NORMAL

## 2022-03-23 PROCEDURE — 74011250636 HC RX REV CODE- 250/636: Performed by: INTERNAL MEDICINE

## 2022-03-23 PROCEDURE — 74011000250 HC RX REV CODE- 250: Performed by: INTERNAL MEDICINE

## 2022-03-23 PROCEDURE — 96365 THER/PROPH/DIAG IV INF INIT: CPT

## 2022-03-23 RX ORDER — HYDROCORTISONE SODIUM SUCCINATE 100 MG/2ML
100 INJECTION, POWDER, FOR SOLUTION INTRAMUSCULAR; INTRAVENOUS AS NEEDED
Status: ACTIVE | OUTPATIENT
Start: 2022-03-23 | End: 2022-03-23

## 2022-03-23 RX ORDER — SODIUM CHLORIDE 0.9 % (FLUSH) 0.9 %
10 SYRINGE (ML) INJECTION AS NEEDED
Status: ACTIVE | OUTPATIENT
Start: 2022-03-23 | End: 2022-03-23

## 2022-03-23 RX ORDER — DIPHENHYDRAMINE HYDROCHLORIDE 50 MG/ML
50 INJECTION, SOLUTION INTRAMUSCULAR; INTRAVENOUS AS NEEDED
Status: ACTIVE | OUTPATIENT
Start: 2022-03-23 | End: 2022-03-23

## 2022-03-23 RX ORDER — SODIUM CHLORIDE 9 MG/ML
25 INJECTION, SOLUTION INTRAVENOUS CONTINUOUS
Status: ACTIVE | OUTPATIENT
Start: 2022-03-23 | End: 2022-03-23

## 2022-03-23 RX ADMIN — SODIUM CHLORIDE 25 ML/HR: 900 INJECTION, SOLUTION INTRAVENOUS at 08:51

## 2022-03-23 RX ADMIN — SODIUM CHLORIDE, PRESERVATIVE FREE 10 ML: 5 INJECTION INTRAVENOUS at 08:51

## 2022-03-23 RX ADMIN — FERRIC CARBOXYMALTOSE INJECTION 750 MG: 50 INJECTION, SOLUTION INTRAVENOUS at 08:55

## 2022-03-23 NOTE — PROGRESS NOTES
Patient arrived ambulatory to infusion area. Injectafer infusion completed. Patient tolerated well. Observed for 30 minutes. Discharged ambulatory.

## 2022-03-24 PROBLEM — E61.1 IRON DEFICIENCY: Status: ACTIVE | Noted: 2022-03-16

## 2022-04-07 ENCOUNTER — HOSPITAL ENCOUNTER (OUTPATIENT)
Dept: ULTRASOUND IMAGING | Age: 67
Discharge: HOME | End: 2022-04-07
Attending: INTERNAL MEDICINE
Payer: COMMERCIAL

## 2022-04-07 DIAGNOSIS — K75.81 LIVER CIRRHOSIS SECONDARY TO NASH (HCC): ICD-10-CM

## 2022-04-07 DIAGNOSIS — D69.6 THROMBOCYTOPENIA (HCC): ICD-10-CM

## 2022-04-07 DIAGNOSIS — K74.60 LIVER CIRRHOSIS SECONDARY TO NASH (HCC): ICD-10-CM

## 2022-04-07 PROCEDURE — 76700 US EXAM ABDOM COMPLETE: CPT

## 2022-04-07 PROCEDURE — 76705 ECHO EXAM OF ABDOMEN: CPT

## 2022-04-11 ENCOUNTER — HOSPITAL ENCOUNTER (OUTPATIENT)
Dept: LAB | Age: 67
Discharge: HOME | End: 2022-04-11
Payer: COMMERCIAL

## 2022-04-11 DIAGNOSIS — D69.3 IDIOPATHIC THROMBOCYTOPENIC PURPURA (ITP) (HCC): ICD-10-CM

## 2022-04-11 LAB
ALBUMIN SERPL-MCNC: 2.9 G/DL (ref 3.2–4.6)
ALBUMIN/GLOB SERPL: 0.9 {RATIO} (ref 1.2–3.5)
ALP SERPL-CCNC: 95 U/L (ref 50–136)
ALT SERPL-CCNC: 18 U/L (ref 12–65)
ANION GAP SERPL CALC-SCNC: 4 MMOL/L (ref 7–16)
AST SERPL-CCNC: 15 U/L (ref 15–37)
BASOPHILS # BLD: 0 K/UL (ref 0–0.2)
BASOPHILS NFR BLD: 0 % (ref 0–2)
BILIRUB SERPL-MCNC: 0.2 MG/DL (ref 0.2–1.1)
BUN SERPL-MCNC: 31 MG/DL (ref 8–23)
CALCIUM SERPL-MCNC: 8.3 MG/DL (ref 8.3–10.4)
CHLORIDE SERPL-SCNC: 114 MMOL/L (ref 98–107)
CO2 SERPL-SCNC: 25 MMOL/L (ref 21–32)
CREAT SERPL-MCNC: 1.1 MG/DL (ref 0.6–1)
DIFFERENTIAL METHOD BLD: ABNORMAL
EOSINOPHIL # BLD: 0.2 K/UL (ref 0–0.8)
EOSINOPHIL NFR BLD: 2 % (ref 0.5–7.8)
ERYTHROCYTE [DISTWIDTH] IN BLOOD BY AUTOMATED COUNT: 19 % (ref 11.9–14.6)
GLOBULIN SER CALC-MCNC: 3.1 G/DL (ref 2.3–3.5)
GLUCOSE SERPL-MCNC: 99 MG/DL (ref 65–100)
HCT VFR BLD AUTO: 36.9 % (ref 35.8–46.3)
HGB BLD-MCNC: 12.3 G/DL (ref 11.7–15.4)
IMM GRANULOCYTES # BLD AUTO: 0.2 K/UL (ref 0–0.5)
IMM GRANULOCYTES NFR BLD AUTO: 1 % (ref 0–5)
LYMPHOCYTES # BLD: 3.3 K/UL (ref 0.5–4.6)
LYMPHOCYTES NFR BLD: 23 % (ref 13–44)
MCH RBC QN AUTO: 30.6 PG (ref 26.1–32.9)
MCHC RBC AUTO-ENTMCNC: 33.3 G/DL (ref 31.4–35)
MCV RBC AUTO: 91.8 FL (ref 79.6–97.8)
MONOCYTES # BLD: 0.6 K/UL (ref 0.1–1.3)
MONOCYTES NFR BLD: 4 % (ref 4–12)
NEUTS SEG # BLD: 10.1 K/UL (ref 1.7–8.2)
NEUTS SEG NFR BLD: 70 % (ref 43–78)
NRBC # BLD: 0 K/UL (ref 0–0.2)
PLATELET # BLD AUTO: 108 K/UL (ref 150–450)
PMV BLD AUTO: 12.7 FL (ref 9.4–12.3)
POTASSIUM SERPL-SCNC: 3.9 MMOL/L (ref 3.5–5.1)
PROT SERPL-MCNC: 6 G/DL (ref 6.3–8.2)
RBC # BLD AUTO: 4.02 M/UL (ref 4.05–5.2)
SODIUM SERPL-SCNC: 143 MMOL/L (ref 136–145)
WBC # BLD AUTO: 14.3 K/UL (ref 4.3–11.1)

## 2022-04-11 PROCEDURE — 85025 COMPLETE CBC W/AUTO DIFF WBC: CPT

## 2022-04-11 PROCEDURE — 36415 COLL VENOUS BLD VENIPUNCTURE: CPT

## 2022-04-11 PROCEDURE — 80053 COMPREHEN METABOLIC PANEL: CPT

## 2022-05-11 ENCOUNTER — HOSPITAL ENCOUNTER (OUTPATIENT)
Dept: LAB | Age: 67
Discharge: HOME | End: 2022-05-11
Payer: COMMERCIAL

## 2022-05-11 DIAGNOSIS — D69.3 IDIOPATHIC THROMBOCYTOPENIC PURPURA (ITP) (HCC): ICD-10-CM

## 2022-05-11 DIAGNOSIS — D69.3 IDIOPATHIC THROMBOCYTOPENIC PURPURA (ITP) (HCC): Primary | ICD-10-CM

## 2022-05-11 PROBLEM — N18.30 CHRONIC RENAL DISEASE, STAGE III (HCC): Status: ACTIVE | Noted: 2022-05-11

## 2022-05-11 LAB
ALBUMIN SERPL-MCNC: 2.8 G/DL (ref 3.2–4.6)
ALBUMIN/GLOB SERPL: 0.5 {RATIO} (ref 1.2–3.5)
ALP SERPL-CCNC: 86 U/L (ref 50–136)
ALT SERPL-CCNC: 33 U/L (ref 12–65)
ANION GAP SERPL CALC-SCNC: 5 MMOL/L (ref 7–16)
AST SERPL-CCNC: 21 U/L (ref 15–37)
BASOPHILS # BLD: 0 K/UL (ref 0–0.2)
BASOPHILS NFR BLD: 0 % (ref 0–2)
BILIRUB SERPL-MCNC: 0.3 MG/DL (ref 0.2–1.1)
BUN SERPL-MCNC: 33 MG/DL (ref 8–23)
CALCIUM SERPL-MCNC: 9.2 MG/DL (ref 8.3–10.4)
CHLORIDE SERPL-SCNC: 107 MMOL/L (ref 98–107)
CO2 SERPL-SCNC: 25 MMOL/L (ref 21–32)
CREAT SERPL-MCNC: 1.1 MG/DL (ref 0.6–1)
DIFFERENTIAL METHOD BLD: ABNORMAL
EOSINOPHIL # BLD: 0.1 K/UL (ref 0–0.8)
EOSINOPHIL NFR BLD: 0 % (ref 0.5–7.8)
ERYTHROCYTE [DISTWIDTH] IN BLOOD BY AUTOMATED COUNT: 18.6 % (ref 11.9–14.6)
GLOBULIN SER CALC-MCNC: 5.4 G/DL (ref 2.3–3.5)
GLUCOSE SERPL-MCNC: 115 MG/DL (ref 65–100)
HCT VFR BLD AUTO: 40.2 % (ref 35.8–46.3)
HGB BLD-MCNC: 13.5 G/DL (ref 11.7–15.4)
IMM GRANULOCYTES # BLD AUTO: 0.2 K/UL (ref 0–0.5)
IMM GRANULOCYTES NFR BLD AUTO: 1 % (ref 0–5)
LYMPHOCYTES # BLD: 1.8 K/UL (ref 0.5–4.6)
LYMPHOCYTES NFR BLD: 10 % (ref 13–44)
MCH RBC QN AUTO: 31 PG (ref 26.1–32.9)
MCHC RBC AUTO-ENTMCNC: 33.6 G/DL (ref 31.4–35)
MCV RBC AUTO: 92.4 FL (ref 79.6–97.8)
MONOCYTES # BLD: 0.9 K/UL (ref 0.1–1.3)
MONOCYTES NFR BLD: 5 % (ref 4–12)
NEUTS SEG # BLD: 14.9 K/UL (ref 1.7–8.2)
NEUTS SEG NFR BLD: 83 % (ref 43–78)
NRBC # BLD: 0 K/UL (ref 0–0.2)
PLATELET # BLD AUTO: 75 K/UL (ref 150–450)
PMV BLD AUTO: 12.5 FL (ref 9.4–12.3)
POTASSIUM SERPL-SCNC: 4.5 MMOL/L (ref 3.5–5.1)
PROT SERPL-MCNC: 8.2 G/DL (ref 6.3–8.2)
RBC # BLD AUTO: 4.35 M/UL (ref 4.05–5.2)
SODIUM SERPL-SCNC: 137 MMOL/L (ref 136–145)
WBC # BLD AUTO: 18 K/UL (ref 4.3–11.1)

## 2022-05-11 PROCEDURE — 80053 COMPREHEN METABOLIC PANEL: CPT

## 2022-05-11 PROCEDURE — 85025 COMPLETE CBC W/AUTO DIFF WBC: CPT

## 2022-05-11 PROCEDURE — 36415 COLL VENOUS BLD VENIPUNCTURE: CPT

## 2022-05-17 DIAGNOSIS — Z01.818 PRE-OP TESTING: Primary | ICD-10-CM

## 2022-05-23 ENCOUNTER — TELEPHONE (OUTPATIENT)
Dept: FAMILY MEDICINE CLINIC | Facility: CLINIC | Age: 67
End: 2022-05-23

## 2022-05-23 ENCOUNTER — OFFICE VISIT (OUTPATIENT)
Dept: FAMILY MEDICINE CLINIC | Facility: CLINIC | Age: 67
End: 2022-05-23
Payer: COMMERCIAL

## 2022-05-23 VITALS
OXYGEN SATURATION: 95 % | SYSTOLIC BLOOD PRESSURE: 136 MMHG | BODY MASS INDEX: 28.98 KG/M2 | RESPIRATION RATE: 17 BRPM | HEART RATE: 72 BPM | WEIGHT: 157.5 LBS | HEIGHT: 62 IN | DIASTOLIC BLOOD PRESSURE: 70 MMHG | TEMPERATURE: 97.4 F

## 2022-05-23 DIAGNOSIS — M51.36 DDD (DEGENERATIVE DISC DISEASE), LUMBAR: ICD-10-CM

## 2022-05-23 DIAGNOSIS — J44.9 CHRONIC OBSTRUCTIVE PULMONARY DISEASE, UNSPECIFIED COPD TYPE (HCC): ICD-10-CM

## 2022-05-23 DIAGNOSIS — M16.11 PRIMARY OSTEOARTHRITIS OF RIGHT HIP: ICD-10-CM

## 2022-05-23 DIAGNOSIS — Z01.818 PRE-OPERATIVE CLEARANCE: Primary | ICD-10-CM

## 2022-05-23 DIAGNOSIS — I10 ESSENTIAL HYPERTENSION: ICD-10-CM

## 2022-05-23 DIAGNOSIS — F41.9 ANXIETY: ICD-10-CM

## 2022-05-23 DIAGNOSIS — D69.3 IDIOPATHIC THROMBOCYTOPENIC PURPURA (ITP) (HCC): ICD-10-CM

## 2022-05-23 PROCEDURE — 99214 OFFICE O/P EST MOD 30 MIN: CPT | Performed by: PHYSICIAN ASSISTANT

## 2022-05-23 RX ORDER — IPRATROPIUM BROMIDE AND ALBUTEROL SULFATE 2.5; .5 MG/3ML; MG/3ML
3 SOLUTION RESPIRATORY (INHALATION) EVERY 6 HOURS PRN
Qty: 25 EACH | Refills: 5 | Status: SHIPPED | OUTPATIENT
Start: 2022-05-23

## 2022-05-23 RX ORDER — GABAPENTIN 300 MG/1
300 CAPSULE ORAL 2 TIMES DAILY
Qty: 180 CAPSULE | Refills: 1 | Status: SHIPPED | OUTPATIENT
Start: 2022-05-23 | End: 2022-08-21

## 2022-05-23 RX ORDER — FLUTICASONE FUROATE, UMECLIDINIUM BROMIDE AND VILANTEROL TRIFENATATE 100; 62.5; 25 UG/1; UG/1; UG/1
1 POWDER RESPIRATORY (INHALATION) DAILY
COMMUNITY

## 2022-05-23 ASSESSMENT — ENCOUNTER SYMPTOMS
SHORTNESS OF BREATH: 0
VOMITING: 0
CONSTIPATION: 0
COUGH: 0
NAUSEA: 0
CHEST TIGHTNESS: 0
DIARRHEA: 0
ABDOMINAL PAIN: 0

## 2022-05-23 NOTE — PROGRESS NOTES
[facility]  [address]  [address]  Phone [phone]      Patient: Simran Hammond  YOB: 1955  Age 75 y.o. Sex female  Medical Record:  [id number]  Visit Date: 05/23/22  Author:  Arlene Nielsen PA-C    Family Practice Clinic Note    Chief Complaint   Patient presents with    Other     surgery clearance       History of Present Illness  This is a 79-year-old female with a history of COPD, depression/anxiety, GERD, hypothyroidism, hepatic cirrhosis, lupus, CKD 3 and hypertension she presents today for preoperative clearance. She is scheduled to undergo right total hip arthroplasty by Dr. Josh Connell on 6/6/2022. She notes that she is scheduled for preoperative EKG and labs tomorrow. She had labs done earlier this month through her hematologist/oncologist Dr. Param Watkins. Notes that she is very anxious to get the surgery over with. She has been in quite a bit of pain in regards to her hip. She denies any chest pain, palpitations or lower extremity edema. Denies any increase in her baseline shortness of breath secondary to COPD.   Reports compliance with    Past History:    Past Medical history   Past Medical History:   Diagnosis Date    Abnormal Papanicolaou smear of cervix     Chronic obstructive pulmonary disease (HCC)     daily inhalers    Depression     Gallbladder cancer (HCC)     GERD (gastroesophageal reflux disease)     daily meds    Hypothyroidism     Liver disease     Cirrhosis and Fatty Liver    Lupus ([address]     Lupus ([facility]     Memory loss     Menopause     OA (osteoarthritis)     Prediabetes     oral agents: doesn't check glucose at home       Current Problem List:   Patient Active Problem List   Diagnosis    Right foot drop    Primary hypothyroidism    Vaginal dryness    Chronic fatigue    Anxiety    Tobacco abuse    Thyroid nodule    DDD (degenerative disc disease), lumbar    Cervical lymphadenopathy    Depression, major, severe recurrence ([facility]    [facility] to DUR[facility]N ([facility]    Pure hypercholesterolemia    Vasomotor symptoms due to menopause    Lupus ([facility]    Essential hypertension    Lumbar stenosis without neurogenic claudication    Elevated brain natriuretic peptide (BNP) level    H/O: iron deficiency anemia    Iron deficiency    Idiopathic thrombocytopenic purpura (ITP) (HCC)    Chronic renal disease, stage III (HCC)       Current Medications: . Current Outpatient Medications   Medication Sig Dispense Refill    gabapentin (NEURONTIN) 300 MG capsule Take 1 capsule by mouth 2 times daily for 90 days.  TAKE ONE CAPSULE BY MOUTH TWICE A  capsule 1    ipratropium-albuterol (DUONEB) 0.5-2.5 (3) MG/3ML SOLN nebulizer solution Inhale 3 mLs into the lungs every 6 hours as needed for Shortness of Breath 25 each 5    fluticasone-umeclidin-vilant (TRELEGY ELLIPTA) 100-62.5-25 MCG/INH AEPB Inhale 1 puff into the lungs daily      albuterol sulfate  (90 Base) MCG/ACT inhaler Inhale 2 puffs into the lungs every 4 hours as needed      amLODIPine (NORVASC) 10 MG tablet Take 10 mg by mouth daily      baclofen (LIORESAL) 10 MG tablet Take 10 mg by mouth 3 times daily      buPROPion (WELLBUTRIN XL) 150 MG extended release tablet Take 150 mg by mouth      celecoxib (CELEBREX) 200 MG capsule Take 200 mg by mouth daily      clobetasol (TEMOVATE) 0.05 % cream Apply topically 2 times daily      Docusate Sodium 100 MG TABS Take by mouth      donepezil (ARICEPT) 10 MG tablet Patient takes 2 at night      estradiol (ESTRACE) 1 MG tablet Take 1 mg by mouth daily      hydrOXYzine (ATARAX) 50 MG tablet Take 50 mg by mouth every 8 hours as needed      levothyroxine (SYNTHROID) 50 MCG tablet Take 50 mcg by mouth every morning (before breakfast)      omeprazole (PRILOSEC OTC) 20 MG tablet Take 20 mg by mouth      ondansetron (ZOFRAN-ODT) 4 MG disintegrating tablet Take 4 mg by mouth every 8 hours as needed      traZODone (DESYREL) 50 MG tablet Take 50 mg by mouth      ursodiol (ACTIGALL) 500 MG tablet Take 500 mg by mouth 2 times daily      vilazodone HCl (VIIBRYD) 40 MG TABS TAKE ONE TABLET BY MOUTH EVERY MORNING       No current facility-administered medications for this visit. Allergies: Allergies   Allergen Reactions    Codeine Other (See Comments)     \"nightmares, I hear things\"  Other reaction(s): Nausea and/or vomiting-Intolerance, Other (see comments), Other- (not listed) - Allergy  HALLUCINATIONS AND NIGHTMARES PER PT  HALLUCINATIONS AND NIGHTMARES PER PT    Nicotine Hives       Surgical History:  Past Surgical History:   Procedure Laterality Date    CHOLECYSTECTOMY      COLONOSCOPY N/A 2019    COLONOSCOPY/ 30 performed by Yina Neumann MD at [facility] ENDOSCOPY    COLONOSCOPY N/A 2017    COLONOSCOPY  BMI 35 performed by Manuela De Anda MD at [address] [place] [address]    due to IMB and \"low grade abnormal cervical cells. \"    OTHER SURGICAL HISTORY      liver bx    OTHER SURGICAL HISTORY      wound debridement       Family History:  Family History   Adopted: Yes   Problem Relation Age of Onset    Breast Cancer Neg Hx        Social History:   Social History     Social History Narrative    Not on file      Social History     Socioeconomic History    Marital status:      Spouse name: Not on file    Number of children: Not on file    Years of education: Not on file    Highest education level: Not on file   Occupational History    Not on file   Tobacco Use    Smoking status: Former Smoker     Packs/day: 0.50     Quit date: 2022     Years since quittin.3    Smokeless tobacco: Never Used   Substance and Sexual Activity    Alcohol use:  Yes    Drug use: Yes     Types: Marijuana Facundo Pare)    Sexual activity: Not on file   Other Topics Concern    Not on file   Social History Narrative    Not on file     Social Determinants of Health     Financial Resource Strain:     Difficulty of Paying Living Expenses: Not on file   Food Insecurity:     Worried About Running Out of Food in the Last Year: Not on file    Majo of Food in the Last Year: Not on file   Transportation Needs:     Lack of Transportation (Medical): Not on file    Lack of Transportation (Non-Medical): Not on file   Physical Activity:     Days of Exercise per Week: Not on file    Minutes of Exercise per Session: Not on file   Stress:     Feeling of Stress : Not on file   Social Connections:     Frequency of Communication with Friends and Family: Not on file    Frequency of Social Gatherings with Friends and Family: Not on file    Attends Alevism Services: Not on file    Active Member of [address] [place] [address] or Organizations: Not on file    Attends Club or Organization Meetings: Not on file    Marital Status: Not on file   Intimate Partner Violence:     Fear of Current or Ex-Partner: Not on file    Emotionally Abused: Not on file    Physically Abused: Not on file    Sexually Abused: Not on file   Housing Stability:     Unable to Pay for Housing in the Last Year: Not on file    Number of [place] in the Last Year: Not on file    Unstable Housing in the Last Year: Not on file         ROS  Review of Systems   Constitutional: Negative for chills and fever. HENT: Negative for congestion. Eyes: Negative for visual disturbance. Respiratory: Negative for cough, chest tightness and shortness of breath. Cardiovascular: Negative for chest pain, palpitations and leg swelling. Gastrointestinal: Negative for abdominal pain, constipation, diarrhea, nausea and vomiting. Genitourinary: Negative for dysuria, frequency and hematuria. Musculoskeletal: Positive for arthralgias. Skin: Negative for rash. Neurological: Negative for dizziness, light-headedness and headaches. Hematological: Does not bruise/bleed easily.          /70 (Site: Left Upper Arm) Comment: manual  Pulse 72   Temp 97.4 °F (36.3 °C) (Temporal)   Resp 17   Ht 5' 2\" (1.575 m)   Wt 157 lb 8 oz (71.4 kg)   SpO2 95% Comment: room air sitting  BMI 28.81 kg/m²   Body mass index is 28.81 kg/m². Physical Exam    Physical Exam  Vitals and nursing note reviewed. Constitutional:       General: She is not in acute distress. Appearance: Normal appearance. She is not ill-appearing. HENT:      Head: Normocephalic. Right Ear: External ear normal.      Left Ear: External ear normal.      Nose: Nose normal.      Mouth/Throat:      Pharynx: Oropharynx is clear. Eyes:      Extraocular Movements: Extraocular movements intact. Conjunctiva/sclera: Conjunctivae normal.      Pupils: Pupils are equal, round, and reactive to light. Cardiovascular:      Rate and Rhythm: Normal rate and regular rhythm. Heart sounds: Normal heart sounds. Pulmonary:      Effort: Pulmonary effort is normal.      Breath sounds: Normal breath sounds. No wheezing, rhonchi or rales. Abdominal:      General: Bowel sounds are normal.      Palpations: Abdomen is soft. Tenderness: There is no abdominal tenderness. Musculoskeletal:      Cervical back: Neck supple. Right lower leg: No edema. Left lower leg: No edema. Lymphadenopathy:      Cervical: No cervical adenopathy. Skin:     General: Skin is warm and dry. Neurological:      General: No focal deficit present. Mental Status: She is alert and oriented to person, place, and time. Gait: Gait abnormal.   Psychiatric:         Mood and Affect: Mood normal.         Behavior: Behavior normal.         Thought Content: Thought content normal.         ASSESSMENT & PLAN    ICD-10-CM    1. Pre-operative clearance  Z01.818    2. Primary osteoarthritis of right hip  M16.11    3. Essential hypertension  I10    4. Chronic obstructive pulmonary disease, unspecified COPD type ([facility]  J44.9 ipratropium-albuterol (DUONEB) 0.5-2.5 (3) MG/3ML SOLN nebulizer solution   5.  DDD (degenerative disc disease), lumbar  M51.36 gabapentin (NEURONTIN) 300 MG capsule   6. Idiopathic thrombocytopenic purpura (ITP) (HCC)  D69.3    7. Anxiety  F41.9         1. Pre-operative clearance  *Patient appears to be a reasonable risk for the anesthesia and surgery planned. Patient reports that her preop labs and EKG are planned for tomorrow. 2. Primary osteoarthritis of right hip  *Plan for right total hip arthroplasty with Dr. Sulaiman Barron 6/6/2022.    3. Essential hypertension  *Well-controlled on present regimen. Continue current medications the same. 4. Chronic obstructive pulmonary disease, unspecified COPD type ([facility]  *Stable on current medications. Continue Trelegy daily along with albuterol and home nebulizer as needed. - ipratropium-albuterol (DUONEB) 0.5-2.5 (3) MG/3ML SOLN nebulizer solution; Inhale 3 mLs into the lungs every 6 hours as needed for Shortness of Breath  Dispense: 25 each; Refill: 5    5. DDD (degenerative disc disease), lumbar  *Stable issue. Continue gabapentin twice daily.  - gabapentin (NEURONTIN) 300 MG capsule; Take 1 capsule by mouth 2 times daily for 90 days. TAKE ONE CAPSULE BY MOUTH TWICE A DAY  Dispense: 180 capsule; Refill: 1    6. Idiopathic thrombocytopenic purpura (ITP) (HCC)  *Following with hematology. 7. Anxiety  *Stable on current medications. 8.  Other  *Case reviewed with attending physician (Dr. Sulema Donald) who was involved in the formulation of the assessment and plan. No orders of the defined types were placed in this encounter. I have reviewed the patient's past medical history, social history and family history and vitals. We have discussed treatment plan and follow up and given patient instructions. Patient's questions are answered and we will follow up as indicated. Dictated using voice recognition software.   Proof read but unrecognized errors may exist.    Follow-up and Dispositions    · Return as previously scheduled, 7/2022, with Madeleine Emerson.          Alexy Cornejo PA-C

## 2022-05-23 NOTE — Clinical Note
[facility] [address]  Phone: [phone]  Fax: [phone]           YAMILEX Fletcher      May 23, 2022     Patient: Ike Larios   MR Number: [id number]   YOB: 1955   Date of Visit: 5/23/2022       Dear Dr. Zoey Reveles: Thank you for referring Nathaniel Cornelius to me for evaluation/treatment. Below are the relevant portions of my assessment and plan of care. If you have questions, please do not hesitate to call me. I look forward to following Beatriz Demarco along with you.     Sincerely,        Dolores Cifuentes PA-C, PA    CC providers:  Marvin Dwyer MD  [address] [place] [id number]  Via Fax: [phone]

## 2022-05-23 NOTE — TELEPHONE ENCOUNTER
Patient came today to be checked in was very high rate and yelling at [facility] Yoandy Cruz  she then refused to fill new patient packet when told this is required by [facility] She Said \"Fu*k [facility] ill be finding a new Doctor after this visit. We continued to apologize to patient because of the new schedule we were having to redo all the forms she then continued to yell at us. . I then reminded the patient that we will not allow her to speak to our staff in that matter and I will notify her physician of this.

## 2022-05-24 ENCOUNTER — NURSE ONLY (OUTPATIENT)
Dept: CARDIOLOGY CLINIC | Age: 67
End: 2022-05-24
Payer: COMMERCIAL

## 2022-05-24 DIAGNOSIS — I10 ESSENTIAL HYPERTENSION: Primary | ICD-10-CM

## 2022-05-24 PROCEDURE — 93000 ELECTROCARDIOGRAM COMPLETE: CPT | Performed by: INTERNAL MEDICINE

## 2022-05-26 ENCOUNTER — OFFICE VISIT (OUTPATIENT)
Dept: ONCOLOGY | Age: 67
End: 2022-05-26
Payer: MEDICAID

## 2022-05-26 ENCOUNTER — HOSPITAL ENCOUNTER (OUTPATIENT)
Dept: LAB | Age: 67
Discharge: HOME | End: 2022-05-29
Payer: COMMERCIAL

## 2022-05-26 VITALS
TEMPERATURE: 98.1 F | SYSTOLIC BLOOD PRESSURE: 133 MMHG | OXYGEN SATURATION: 95 % | RESPIRATION RATE: 24 BRPM | HEIGHT: 62 IN | WEIGHT: 149 LBS | BODY MASS INDEX: 27.42 KG/M2 | HEART RATE: 83 BPM | DIASTOLIC BLOOD PRESSURE: 79 MMHG

## 2022-05-26 DIAGNOSIS — D69.3 IDIOPATHIC THROMBOCYTOPENIC PURPURA (ITP) (HCC): Primary | ICD-10-CM

## 2022-05-26 DIAGNOSIS — D69.3 IDIOPATHIC THROMBOCYTOPENIC PURPURA (ITP) (HCC): ICD-10-CM

## 2022-05-26 DIAGNOSIS — Z01.818 PRE-OP TESTING: ICD-10-CM

## 2022-05-26 LAB
ALBUMIN SERPL-MCNC: 3.4 G/DL (ref 3.2–4.6)
ALBUMIN/GLOB SERPL: 0.8 {RATIO} (ref 1.2–3.5)
ALP SERPL-CCNC: 91 U/L (ref 50–136)
ALT SERPL-CCNC: 28 U/L (ref 12–65)
ANION GAP SERPL CALC-SCNC: 8 MMOL/L (ref 7–16)
APTT PPP: 22.3 SEC (ref 24.1–35.1)
AST SERPL-CCNC: 22 U/L (ref 15–37)
BASOPHILS # BLD: 0 K/UL (ref 0–0.2)
BASOPHILS NFR BLD: 0 % (ref 0–2)
BILIRUB SERPL-MCNC: 0.4 MG/DL (ref 0.2–1.1)
BUN SERPL-MCNC: 34 MG/DL (ref 8–23)
CALCIUM SERPL-MCNC: 9.4 MG/DL (ref 8.3–10.4)
CHLORIDE SERPL-SCNC: 105 MMOL/L (ref 98–107)
CO2 SERPL-SCNC: 24 MMOL/L (ref 21–32)
CREAT SERPL-MCNC: 1.6 MG/DL (ref 0.6–1)
DIFFERENTIAL METHOD BLD: ABNORMAL
EOSINOPHIL # BLD: 0 K/UL (ref 0–0.8)
EOSINOPHIL NFR BLD: 0 % (ref 0.5–7.8)
ERYTHROCYTE [DISTWIDTH] IN BLOOD BY AUTOMATED COUNT: 17.5 % (ref 11.9–14.6)
GLOBULIN SER CALC-MCNC: 4.2 G/DL (ref 2.3–3.5)
GLUCOSE SERPL-MCNC: 211 MG/DL (ref 65–100)
HCT VFR BLD AUTO: 45.9 % (ref 35.8–46.3)
HGB BLD-MCNC: 15.5 G/DL (ref 11.7–15.4)
IMM GRANULOCYTES # BLD AUTO: 0.1 K/UL (ref 0–0.5)
IMM GRANULOCYTES NFR BLD AUTO: 1 % (ref 0–5)
INR PPP: 0.9
LYMPHOCYTES # BLD: 1.4 K/UL (ref 0.5–4.6)
LYMPHOCYTES NFR BLD: 8 % (ref 13–44)
MCH RBC QN AUTO: 31.3 PG (ref 26.1–32.9)
MCHC RBC AUTO-ENTMCNC: 33.8 G/DL (ref 31.4–35)
MCV RBC AUTO: 92.5 FL (ref 79.6–97.8)
MONOCYTES # BLD: 0.6 K/UL (ref 0.1–1.3)
MONOCYTES NFR BLD: 3 % (ref 4–12)
NEUTS SEG # BLD: 16.1 K/UL (ref 1.7–8.2)
NEUTS SEG NFR BLD: 88 % (ref 43–78)
NRBC # BLD: 0 K/UL (ref 0–0.2)
PLATELET # BLD AUTO: 157 K/UL (ref 150–450)
PMV BLD AUTO: 12.1 FL (ref 9.4–12.3)
POTASSIUM SERPL-SCNC: 3.8 MMOL/L (ref 3.5–5.1)
PREALB SERPL-MCNC: 38.4 MG/DL (ref 18–35.7)
PROT SERPL-MCNC: 7.6 G/DL (ref 6.3–8.2)
PROTHROMBIN TIME: 12.4 SEC (ref 12.6–14.5)
RBC # BLD AUTO: 4.96 M/UL (ref 4.05–5.2)
SODIUM SERPL-SCNC: 137 MMOL/L (ref 136–145)
TRANSFERRIN SERPL-MCNC: 297 MG/DL (ref 202–364)
WBC # BLD AUTO: 18.2 K/UL (ref 4.3–11.1)

## 2022-05-26 PROCEDURE — 85025 COMPLETE CBC W/AUTO DIFF WBC: CPT

## 2022-05-26 PROCEDURE — 1123F ACP DISCUSS/DSCN MKR DOCD: CPT | Performed by: INTERNAL MEDICINE

## 2022-05-26 PROCEDURE — 84466 ASSAY OF TRANSFERRIN: CPT

## 2022-05-26 PROCEDURE — 87086 URINE CULTURE/COLONY COUNT: CPT

## 2022-05-26 PROCEDURE — 36415 COLL VENOUS BLD VENIPUNCTURE: CPT

## 2022-05-26 PROCEDURE — 85610 PROTHROMBIN TIME: CPT

## 2022-05-26 PROCEDURE — 85730 THROMBOPLASTIN TIME PARTIAL: CPT

## 2022-05-26 PROCEDURE — 99214 OFFICE O/P EST MOD 30 MIN: CPT | Performed by: INTERNAL MEDICINE

## 2022-05-26 PROCEDURE — 80053 COMPREHEN METABOLIC PANEL: CPT

## 2022-05-26 PROCEDURE — 84134 ASSAY OF PREALBUMIN: CPT

## 2022-05-26 RX ORDER — PREDNISONE 20 MG/1
60 TABLET ORAL
COMMUNITY
Start: 2022-05-16 | End: 2022-06-06

## 2022-05-26 RX ORDER — LISINOPRIL 20 MG/1
20 TABLET ORAL DAILY
COMMUNITY

## 2022-05-26 RX ORDER — LORAZEPAM 0.5 MG/1
0.5 TABLET ORAL 3 TIMES DAILY PRN
COMMUNITY
Start: 2016-07-15

## 2022-05-26 RX ORDER — HYDROCODONE BITARTRATE AND ACETAMINOPHEN 10; 325 MG/1; MG/1
TABLET ORAL
COMMUNITY
Start: 2022-05-14

## 2022-05-26 RX ORDER — BUSPIRONE HYDROCHLORIDE 15 MG/1
15 TABLET ORAL 3 TIMES DAILY
COMMUNITY
End: 2022-06-28 | Stop reason: SDUPTHER

## 2022-05-26 RX ORDER — DONEPEZIL HYDROCHLORIDE 10 MG/1
TABLET, FILM COATED ORAL
Qty: 60 TABLET | Refills: 3 | OUTPATIENT
Start: 2022-05-26

## 2022-05-26 RX ORDER — MELOXICAM 15 MG/1
15 TABLET ORAL DAILY
COMMUNITY

## 2022-05-26 RX ORDER — BUPROPION HYDROCHLORIDE 150 MG/1
TABLET ORAL
Qty: 30 TABLET | Refills: 5 | OUTPATIENT
Start: 2022-05-26

## 2022-05-26 RX ORDER — QUETIAPINE 150 MG/1
150 TABLET, FILM COATED, EXTENDED RELEASE ORAL
COMMUNITY

## 2022-05-26 RX ORDER — OXYCODONE HYDROCHLORIDE AND ACETAMINOPHEN 5; 325 MG/1; MG/1
TABLET ORAL
COMMUNITY
Start: 2022-03-25

## 2022-05-26 RX ORDER — MELATONIN 10 MG
5 CAPSULE ORAL NIGHTLY
COMMUNITY

## 2022-05-26 RX ORDER — CEPHALEXIN 500 MG/1
CAPSULE ORAL
COMMUNITY
Start: 2022-03-25

## 2022-05-26 RX ORDER — TEMAZEPAM 30 MG/1
30 CAPSULE ORAL
COMMUNITY

## 2022-05-26 RX ORDER — TRAMADOL HYDROCHLORIDE 50 MG/1
50 TABLET ORAL EVERY 6 HOURS PRN
COMMUNITY

## 2022-05-26 ASSESSMENT — PATIENT HEALTH QUESTIONNAIRE - PHQ9
SUM OF ALL RESPONSES TO PHQ QUESTIONS 1-9: 2
SUM OF ALL RESPONSES TO PHQ9 QUESTIONS 1 & 2: 2
2. FEELING DOWN, DEPRESSED OR HOPELESS: 2
SUM OF ALL RESPONSES TO PHQ QUESTIONS 1-9: 2
1. LITTLE INTEREST OR PLEASURE IN DOING THINGS: 0

## 2022-05-26 NOTE — PATIENT INSTRUCTIONS
Patient Instructions from Today's Visit    Reason for Visit:  Follow-up visit for thrombocytopenia       Plan:  -Platelet count is 041 - adequate for surgery.   -Once the surgery is done, wean your prednisone by 20 mg each week. -A platelet count of 58-42 is ok to live with as long as you aren't having bleeding or a planned surgery. Continue to check labs with your primary care doctor every 4-6 months to monitor. Constipation  · Various medications can possibly cause constipation so watch for this very closely  · Eat more high fiber foods   · Drink at least 8 cups of water or other fluids each day  · Prune juice, hot tea, or coffee can sometimes help stimulate bowel movements  · Over the counter stool softeners - colace, Senokot S, etc  · Take 1-2 tablets/capsules 1-2 times per day  · May take Miralax in addition to stool softeners if needed. · Controlling this is different for everyone. You will have to play with the medications to figure out what works for you.     Follow Up:  -As needed    Recent Lab Results:  Hospital Outpatient Visit on 05/26/2022   Component Date Value Ref Range Status    WBC 05/26/2022 18.2* 4.3 - 11.1 K/uL Final    RBC 05/26/2022 4.96  4.05 - 5.2 M/uL Final    Hemoglobin 05/26/2022 15.5* 11.7 - 15.4 g/dL Final    Hematocrit 05/26/2022 45.9  35.8 - 46.3 % Final    MCV 05/26/2022 92.5  79.6 - 97.8 FL Final    MCH 05/26/2022 31.3  26.1 - 32.9 PG Final    MCHC 05/26/2022 33.8  31.4 - 35.0 g/dL Final    RDW 05/26/2022 17.5* 11.9 - 14.6 % Final    Platelets 72/11/5384 157  150 - 450 K/uL Final    MPV 05/26/2022 12.1  9.4 - 12.3 FL Final    nRBC 05/26/2022 0.00  0.0 - 0.2 K/uL Final    **Note: Absolute NRBC parameter is now reported with Hemogram**    Seg Neutrophils 05/26/2022 88* 43 - 78 % Final    Lymphocytes 05/26/2022 8* 13 - 44 % Final    Monocytes 05/26/2022 3* 4.0 - 12.0 % Final    Eosinophils % 05/26/2022 0* 0.5 - 7.8 % Final    Basophils 05/26/2022 0  0.0 - 2.0 % Final    Immature Granulocytes 05/26/2022 1  0.0 - 5.0 % Final    Segs Absolute 05/26/2022 16.1* 1.7 - 8.2 K/UL Final    Absolute Lymph # 05/26/2022 1.4  0.5 - 4.6 K/UL Final    Absolute Mono # 05/26/2022 0.6  0.1 - 1.3 K/UL Final    Absolute Eos # 05/26/2022 0.0  0.0 - 0.8 K/UL Final    Basophils Absolute 05/26/2022 0.0  0.0 - 0.2 K/UL Final    Absolute Immature Granulocyte 05/26/2022 0.1  0.0 - 0.5 K/UL Final    Differential Type 05/26/2022 AUTOMATED    Final    Sodium 05/26/2022 137  136 - 145 mmol/L Final    Potassium 05/26/2022 3.8  3.5 - 5.1 mmol/L Final    Chloride 05/26/2022 105  98 - 107 mmol/L Final    CO2 05/26/2022 24  21 - 32 mmol/L Final    Anion Gap 05/26/2022 8  7 - 16 mmol/L Final    Glucose 05/26/2022 211* 65 - 100 mg/dL Final    BUN 05/26/2022 34* 8 - 23 MG/DL Final    CREATININE 05/26/2022 1.60* 0.6 - 1.0 MG/DL Final    GFR  05/26/2022 41* >60 ml/min/1.73m2 Final    GFR Non- 05/26/2022 34* >60 ml/min/1.73m2 Final    Comment:      Estimated GFR is calculated using the Modification of Diet in Renal Disease (MDRD) Study equation, reported for both  Americans (GFRAA) and non- Americans (GFRNA), and normalized to 1.73m2 body surface area. The physician must decide which value applies to the patient. The MDRD study equation should only be used in individuals age 25 or older. It has not been validated for the following: pregnant women, patients with serious comorbid conditions,or on certain medications, or persons with extremes of body size, muscle mass, or nutritional status.       Calcium 05/26/2022 9.4  8.3 - 10.4 MG/DL Final    Total Bilirubin 05/26/2022 0.4  0.2 - 1.1 MG/DL Final    ALT 05/26/2022 28  12 - 65 U/L Final    AST 05/26/2022 22  15 - 37 U/L Final    Alk Phosphatase 05/26/2022 91  50 - 136 U/L Final    Total Protein 05/26/2022 7.6  6.3 - 8.2 g/dL Final    Albumin 05/26/2022 3.4  3.2 - 4.6 g/dL Final    Globulin 05/26/2022 4.2* 2.3 - 3.5 g/dL Final    Albumin/Globulin Ratio 05/26/2022 0.8* 1.2 - 3.5   Final       Treatment Summary has been discussed and given to patient: N/A      -------------------------------------------------------------------------------------------------------------------  Please call our office at [phone] if you have any  of the following symptoms:   · Fever of 100.5 or greater  · Chills  · Shortness of breath  · Swelling or pain in one leg    After office hours an answering service is available and will contact a provider for emergencies or if you are experiencing any of the above symptoms.  Patient does express an interest in My Chart. My Chart log in information explained on the after visit summary printout at the [facility] [address] desk.     Jamaal Simental RN

## 2022-05-26 NOTE — PROGRESS NOTES
[facility] Hematology [facility] Oncology: Office Visit Progress Note    Chief Complaint:    TCP    History of Present Illness:  Reason for Referral: Thrombocytopenia       Referring Moshe Rincon MD       Primary Care Provider: Glenn Jacques MD       Family History of Cancer/Hematologic Disorders: None reported       Presenting Symptoms: Unexplained 40lbs weight loss and persistently low platelet count       Ms. Helen Berry is a 75 y.o. white female with a history of tobacco use (former ½ pack per day smoker x 45 years; quit in January of 2022), marijuana  use, DM2, HTN, hiatal hernia, insomnia, chronic pain, osteoarthritis, memory loss, lupus, liver cirrhosis secondary to DURÁN, iron deficiency anemia, hypothyroidism, GERD, depression, COPD, cervical cancer-status post ALVIN with retained ovaries in 1977,  status post cholecystectomy with incidental finding of gallbladder cancer (07/06/16), status post partial liver resection and portal lymph node dissection (07/28/16), and spinal neurostimulator. She is being seen by Orthopedic Surgery at [facility] for right  hip pain. She elected to proceed with an anterior approach right total hip replacement, but surgery was delayed as surgeon noted that patient needed to be smoke free for 2 weeks prior to scheduling surgery. She also had a spinal neurostimulator placed  which further delayed surgery.       She presented to the [facility] ED on 2/17/22 reporting 4 days of nausea, vomiting, diarrhea, and severe lower abdominal cramping/twisting sensation. Labs in the ED were significant for platelet count of 679, potassium 3.0, BUN 29, creatinine 2.09, AST 46,  and eGFR 24. CT of the abdomen and pelvis was obtained in the ED revealing a 9 mm cystic lesion within the tail of the pancreas with no pancreatic ductal dilation or peripancreatic  inflammation.  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  Patient was ultimately admitted for acute kidney injury and hypokalemia. By discharge on 2/19/22, labs showed that  platelet count had dropped to 61.        She followed up with orthopedic surgery on 2/22/22. She reported that she was still smoking marijuana, but she had quit smoking cigarettes in January of 2022. Orthopedic surgeon instructed her to follow up with her PCP for a medical clearance and RTC  for pre-operative evaluation. He also advised her to call the office to schedule her hip surgery once she had quit smoking for at least 2 weeks. Orthopedic surgeon noted, \"She would benefit from hip replacement. She needs to refrain from smoking  anything for 6 weeks prior to surgery in a month thereafter. She did mention she has had an unexplained 40 pound weight loss. I do not know the significance of some cystic findings in her pancreas although she had a CT scan which delineated that recommended  an MRI follow-up. Additionally she is mildly anemic but was pretty significantly thrombocytopenic with a platelet count of 60 on her most recent CBC. I am not sure if these are potentially related but this would all have to be teased out prior to having  her hip replaced. \"        On 3/1/22, she presented to her PCP for pre-op evaluation. PCP noted that patient's nerve stimulator prevents her from undergoing MRI suggest by radiology for further assessment of pancreatic lesion and that Gastroenterology will get CT scan with  contrast to further eval once renal function has improved. Labs drawn on 3/1/21 were essentially unremarkable except for platelet count of 85. Review of records in [facility] indicates that patient has had an intermittently low platelet count dating  back to at least 2015, but she has never had work-up for thrombocytopenia.  Patient is now referred to [facility], per primary care, for Hematology evaluation and treatment of thrombocytopenia.                 9/29/2020 15:30  12/30/2020 15:20  11/26/2021 10:29 3/1/2022 11:18           WBC  7.1  7.8  6.9  7.1     RBC  4.07  4.20  4.89  4.59     HGB  12.4  12.5  13.5  12.8     HCT  37.1  38.0  41.0  40.1     MCV  91  91  84  87     MCH  30.5  29.8  27.6  27.9     MCHC  33.4  32.9  32.9  31.9     RDW  13.2  13.4  14.6  17.0 (H)     PLATELET  390 (L)  168 (L)  112 (L)  85 (LL)     NEUTROPHILS  63  65  75  61     Lymphocytes  27  26  19  29     MONOCYTES  7  7  5  6     EOSINOPHILS  2  1  0  2     BASOPHILS  1  1  1  1     IMMATURE GRANULOCYTES  0  0  0  1     ABS. NEUTROPHILS  4.6  5.1  5.1  4.4     ABS. IMM. GRANS.  0.0  0.0  0.0  0.0     Abs Lymphocytes  1.9  2.1  1.3  2.0     ABS. MONOCYTES  0.5  0.5  0.4  0.4     ABS. EOSINOPHILS  0.1  0.1  0.0  0.1     ABS. BASOPHILS  0.0  0.0  0.0  0.0     Sed rate (ESR)        68 (H)                            9/29/2020 15:30  3/1/2022 11:18         INR  0.9  0.9     Prothrombin time  10.2  9.7                  9/29/2020 15:30  12/30/2020 15:20  11/26/2021 10:29  3/1/2022 11:18     Sodium  141  142  140  146 (H)     Potassium  4.3  4.0  3.5  4.9     Chloride  105  109 (H)  103  110 (H)     CO2  21  23  19 (L)  23     Glucose  80  111 (H)  148 (H)  75     BUN  21  18  14  16     Creatinine  1.07 (H)  1.04 (H)  0.98  0.94     BUN/Creatinine ratio  20  17  14  17     Calcium  9.1  9.2  9.6  8.9     GFR est non-AA  54 (L)  56 (L)  60        GFR est AA  63  65  69        Bilirubin, total  <0.2  <0.2  0.3  0.2     Protein, total  6.3  6.3  7.7  6.1     Albumin  3.8  3.9  4.2  3.8     A-G Ratio  1.5  1.6  1.2  1.7     ALT  13  6  9  8     AST  18  13  16  11     Alk.  phosphatase  113  120 (H)  141 (H)  98     Triglyceride     210 (H)  131        Cholesterol, total     153  166        HDL Cholesterol     37 (L)  37 (L)        VLDL, calculated     35  24        LDL, calculated     81  105 (H)        Hemoglobin A1c, (calculated)  6.0 (H)     6.1 (H)  5.9 (H)     Estimated average glucose  126     128  123     Rheumatoid factor        <10.0                                       12/30/2020 15:20  11/26/2021 10:29  12/15/2021 11:07     T4, Total  6.3           T3 Uptake  21 (L)           T4, Free        1.10     TSH  3.110  4.530 (H)  7.230 (H)         CT ABDOMEN PELVIS WO CONTRAST 2/17/2022    FINDINGS:   LOWER CHEST: Unremarkable. ABDOMEN AND PELVIS   Liver: Unremarkable. Biliary  system: Cholecystectomy.     Pancreas: There is a 9 mm cystic lesion within the tail the pancreas (601, 37). No pancreatic ductal dilation or peripancreatic inflammation. Spleen: Unremarkable. Adrenals: Unremarkable.     Genitourinary:  Bilateral calculus present lower pole left kidney measuring 6 mm. No ureteral calculus or hydronephrosis. Right kidney unremarkable. No focal bladder wall thickening. Reproductive organs: Hysterectomy   Gastrointestinal tract: Colonic diverticula without  evidence of diverticulitis. No evidence of bowel obstruction or inflammation. Small hiatal hernia. Appendix normal.   Peritoneum/Extraperitoneum: Unremarkable. No free fluid or air. Vascular: Aortobiiliac calcific atherosclerosis. No aneurysmal  dilatation. Musculoskeletal:  Multilevel degenerative changes of the thoracolumbar spine. There is mild anterolisthesis present at L4-5. IMPRESSION:  No acute CT abnormalities of the abdomen or pelvis. Cystic lesion within the tail the pancreas may represent a sidebranch PMNs. This can be further assessed with nonemergent abdominal MRI.         Notes from Referring Provider: None       Other Pertinent Information:    07/01/2021 (COVID-19, Patience Dues, Primary or Immunocompromised Series, MRNA, PF, 100mcg/0.5mL)   07/29/2021 (COVID-19, Patience Dues, Primary or Immunocompromised Series, MRNA, PF, 100mcg/0.5mL)                Review of Systems:      Constitutional  Denies fever or chills. Denies weight loss or appetite changes. Denies fatigue. Denies anorexia.         HEENT  Denies trauma, bluring vision, hearing loss, ear pain, nosebleeds, sore throat, neck pain and ear discharge. Skin  Denies lesions or rashes. Lungs  Denies shortness of breath, cough, sputum production or hemoptysis. Cardiovascular  Denies chest pain, palpitations, orthopnea, claudication and leg swelling. Gastrointestinal  Denies nausea, vomiting, bowel changes. Denies bloody or black stools. Denies abdominal pain.   Denies dysuria, frequency or hesitancy of urination     Neuro  Denies headaches, visual changes or ataxia. Denies dizziness, tingling, tremors, sensory change, speech change, focal weakness and headaches. Hematology   history of severe anemia and thrombocytopenia. Denies nasal/gum bleeding, denies easy bruise     Endo  Denies heat/cold intolerance, denies diabetes. MSK   severe pain. Denies myalgias and falls. Psychiatric/Behavioral  anxious. Denies depression and substance abuse.             Allergies   Allergen Reactions    Codeine Other (See Comments)     \"nightmares, I hear things\"  Other reaction(s): Nausea and/or vomiting-Intolerance, Other (see comments), Other- (not listed) - Allergy  HALLUCINATIONS AND NIGHTMARES PER PT  HALLUCINATIONS AND NIGHTMARES PER PT    Nicotine Hives     Past Medical History:   Diagnosis Date    Abnormal Papanicolaou smear of cervix     Chronic obstructive pulmonary disease (HCC)     daily inhalers    Depression     Gallbladder cancer (HCC)     GERD (gastroesophageal reflux disease)     daily meds    Hypothyroidism     Liver disease     Cirrhosis and Fatty Liver    Lupus ([address]     Lupus ([facility] [address]     Memory loss     Menopause     OA (osteoarthritis)     Prediabetes     oral agents: doesn't check glucose at home     Past Surgical History:   Procedure Laterality Date    CHOLECYSTECTOMY      COLONOSCOPY N/A 6/7/2019    COLONOSCOPY/ 30 performed by Carolina Becerra MD at [facility] ENDOSCOPY    COLONOSCOPY N/A 11/9/2017    COLONOSCOPY  BMI 35 performed by Savanna Duran MD at [facility] ENDOSCOPY    HYSTERECTOMY, VAGINAL  1977    due to IMB and \"low grade abnormal cervical cells. \"    OTHER SURGICAL HISTORY      liver bx    OTHER SURGICAL HISTORY      wound debridement     Family History   Adopted: Yes   Problem Relation Age of Onset    Breast Cancer Neg Hx      Social History     Socioeconomic History    Marital status:      Spouse name: Not on file    Number of children: Not on file    Years of education: Not on file    Highest education level: Not on file   Occupational History    Not on file   Tobacco Use    Smoking status: Former Smoker     Packs/day: 0.50     Quit date: 2022     Years since quittin.3    Smokeless tobacco: Never Used   Substance and Sexual Activity    Alcohol use: Yes    Drug use: Yes     Types: Marijuana [place])    Sexual activity: Not on file   Other Topics Concern    Not on file   Social History Narrative    Not on file     Social Determinants of Health     Financial Resource Strain:     Difficulty of Paying Living Expenses: Not on file   Food Insecurity:     Worried About Running Out of Food in the Last Year: Not on file    Majo of Food in the Last Year: Not on file   Transportation Needs:     Lack of Transportation (Medical): Not on file    Lack of Transportation (Non-Medical):  Not on file   Physical Activity:     Days of Exercise per Week: Not on file    Minutes of Exercise per Session: Not on file   Stress:     Feeling of Stress : Not on file   Social Connections:     Frequency of Communication with Friends and Family: Not on file    Frequency of Social Gatherings with Friends and Family: Not on file    Attends [employer] Services: Not on file    Active Member of Clubs or Organizations: Not on file    Attends Club or Organization Meetings: Not on file    Marital Status: Not on file   Intimate Partner Violence:     Fear of Current or Ex-Partner: Not on file    Emotionally Abused: Not on file    Physically Abused: Not on file    Sexually Abused: Not on file   Housing Stability:     Unable to Pay for Housing in the Last Year: Not on file    Number of Places Lived in the Last Year: Not on file    Unstable Housing in the Last Year: Not on file     Current Outpatient Medications   Medication Sig Dispense Refill    busPIRone (BUSPAR) 15 MG tablet Take 15 mg by mouth 3 times daily      cephALEXin (KEFLEX) 500 MG capsule       HYDROcodone-acetaminophen (NORCO)  MG per tablet       linaclotide (LINZESS) 145 MCG capsule Take 145 mcg by mouth every morning      lisinopril (PRINIVIL;ZESTRIL) 20 MG tablet Take 20 mg by mouth daily      LORazepam (ATIVAN) 0.5 MG tablet Take 0.5 mg by mouth 3 times daily as needed.  melatonin 10 MG CAPS capsule Take 5 mg by mouth nightly      meloxicam (MOBIC) 15 MG tablet Take 15 mg by mouth daily      oxyCODONE-acetaminophen (PERCOCET) 5-325 MG per tablet       predniSONE (DELTASONE) 20 MG tablet Take 60 mg by mouth daily (with breakfast)      QUEtiapine (SEROQUEL XR) 150 MG TB24 extended release tablet Take 150 mg by mouth      temazepam (RESTORIL) 30 MG capsule Take 30 mg by mouth.  traMADol (ULTRAM) 50 MG tablet Take 50 mg by mouth every 6 hours as needed.  gabapentin (NEURONTIN) 300 MG capsule Take 1 capsule by mouth 2 times daily for 90 days.  TAKE ONE CAPSULE BY MOUTH TWICE A  capsule 1    ipratropium-albuterol (DUONEB) 0.5-2.5 (3) MG/3ML SOLN nebulizer solution Inhale 3 mLs into the lungs every 6 hours as needed for Shortness of Breath 25 each 5    fluticasone-umeclidin-vilant (TRELEGY ELLIPTA) 100-62.5-25 MCG/INH AEPB Inhale 1 puff into the lungs daily      albuterol sulfate  (90 Base) MCG/ACT inhaler Inhale 2 puffs into the lungs every 4 hours as needed      amLODIPine (NORVASC) 10 MG tablet Take 10 mg by mouth daily      baclofen (LIORESAL) 10 MG tablet Take 10 mg by mouth 3 times daily      buPROPion (WELLBUTRIN XL) 150 MG extended release tablet Take 150 mg by mouth      celecoxib (CELEBREX) 200 MG capsule Take 200 mg by mouth daily      clobetasol (TEMOVATE) 0.05 % cream Apply topically 2 times daily      Docusate Sodium 100 MG TABS Take by mouth      donepezil (ARICEPT) 10 MG tablet Patient takes 2 at night      estradiol (ESTRACE) 1 MG tablet Take 1 mg by mouth daily      hydrOXYzine (ATARAX) 50 MG tablet Take 50 mg by mouth every 8 hours as needed      levothyroxine (SYNTHROID) 50 MCG tablet Take 50 mcg by mouth every morning (before breakfast)      omeprazole (PRILOSEC OTC) 20 MG tablet Take 20 mg by mouth      ondansetron (ZOFRAN-ODT) 4 MG disintegrating tablet Take 4 mg by mouth every 8 hours as needed      traZODone (DESYREL) 50 MG tablet Take 50 mg by mouth      ursodiol (ACTIGALL) 500 MG tablet Take 500 mg by mouth 2 times daily      vilazodone HCl (VIIBRYD) 40 MG TABS TAKE ONE TABLET BY MOUTH EVERY MORNING       No current facility-administered medications for this visit. OBJECTIVE:  /79 (Site: Right Upper Arm, Position: Sitting, Cuff Size: Medium Adult)   Pulse 83   Temp 98.1 °F (36.7 °C) (Oral)   Resp 24   Ht 5' 2\" (1.575 m)   Wt 149 lb (67.6 kg)   SpO2 95%   Breastfeeding No   BMI 27.25 kg/m²     Physical Exam:  Constitutional: Oriented to person, place, and time. Well-developed and well-nourished. HEENT: Normocephalic and atraumatic. Oropharynx is clear and moist.   Conjunctivae and EOM are normal. Pupils are equal, round, and reactive to light. No scleral icterus. Neck supple. No JVD present. No tracheal deviation present. No thyromegaly present. Lymph node No palpable submandibular, cervical, supraclavicular, axillary and inguinal lymph nodes. Skin Warm and dry. No bruising and no rash noted. No erythema. No pallor. Respiratory Effort normal and breath sounds normal.  No respiratory distress. No wheezes. No rales. No tenderness. CVS Normal rate, regular rhythm and normal heart sounds. Exam reveals no gallop, no friction and no rub. No murmur heard. Abdomen Soft. Bowel sounds are normal. Exhibits no distension. There is no tenderness. There is no rebound and no guarding. Neuro Normal reflexes. No cranial nerve deficit. Exhibits normal muscle tone, 5 of 5 strength of all extremities. MSK Normal range of motion in general.  No edema and no tenderness.    Psych Normal mood, affect, behavior, judgment and thought content      Labs:  Recent Results (from the past 24 hour(s))   CBC with Auto Differential    Collection Time: 05/26/22 10:26 AM   Result Value Ref Range    WBC 18.2 (H) 4.3 - 11.1 K/uL    RBC 4.96 4.05 - 5.2 M/uL    Hemoglobin 15.5 (H) 11.7 - 15.4 g/dL    Hematocrit 45.9 35.8 - 46.3 %    MCV 92.5 79.6 - 97.8 FL    MCH 31.3 26.1 - 32.9 PG    MCHC 33.8 31.4 - 35.0 g/dL    RDW 17.5 (H) 11.9 - 14.6 %    Platelets 646 089 - 605 K/uL    MPV 12.1 9.4 - 12.3 FL    nRBC 0.00 0.0 - 0.2 K/uL    Seg Neutrophils 88 (H) 43 - 78 %    Lymphocytes 8 (L) 13 - 44 %    Monocytes 3 (L) 4.0 - 12.0 %    Eosinophils % 0 (L) 0.5 - 7.8 %    Basophils 0 0.0 - 2.0 %    Immature Granulocytes 1 0.0 - 5.0 %    Segs Absolute 16.1 (H) 1.7 - 8.2 K/UL    Absolute Lymph # 1.4 0.5 - 4.6 K/UL    Absolute Mono # 0.6 0.1 - 1.3 K/UL    Absolute Eos # 0.0 0.0 - 0.8 K/UL    Basophils Absolute 0.0 0.0 - 0.2 K/UL    Absolute Immature Granulocyte 0.1 0.0 - 0.5 K/UL    Differential Type AUTOMATED     Comprehensive Metabolic Panel    Collection Time: 05/26/22 10:26 AM   Result Value Ref Range    Sodium 137 136 - 145 mmol/L    Potassium 3.8 3.5 - 5.1 mmol/L    Chloride 105 98 - 107 mmol/L    CO2 24 21 - 32 mmol/L    Anion Gap 8 7 - 16 mmol/L    Glucose 211 (H) 65 - 100 mg/dL    BUN 34 (H) 8 - 23 MG/DL    CREATININE 1.60 (H) 0.6 - 1.0 MG/DL    GFR  41 (L) >60 ml/min/1.73m2    GFR Non- 34 (L) >60 ml/min/1.73m2    Calcium 9.4 8.3 - 10.4 MG/DL    Total Bilirubin 0.4 0.2 - 1.1 MG/DL    ALT 28 12 - 65 U/L AST 22 15 - 37 U/L    Alk Phosphatase 91 50 - 136 U/L    Total Protein 7.6 6.3 - 8.2 g/dL    Albumin 3.4 3.2 - 4.6 g/dL    Globulin 4.2 (H) 2.3 - 3.5 g/dL    Albumin/Globulin Ratio 0.8 (L) 1.2 - 3.5         Imaging:  No results found for this or any previous visit. ASSESSMENT/PLAN:   Diagnosis Orders   1. Idiopathic thrombocytopenic purpura (ITP) (HCC)     2. Pre-op testing       75 y.o. F consulted for thrombocytopenia and presented to [facility] on 3/16/2022.   She had documented history of cirrhosis but  denied, tried to interview her past history of fluctuating platelets to discover the relevant factors and patient was emotional the whole time, discussed the platelet down to 44 and she was crying shouting and cursing the whole time for wanting to have  the surgery done next week to replace the stimulator for pain control, discussed her emotional status and she admits depression and distress with her health, pain and separation from her  who accompanied her to the visit, agreed to check liver  and spleen ultrasound, peripheral smear review, try prednisone 60 mg daily and platelet up to 417 in a week, emotion much better controlled, continue prednisone at current dose and proceed to shoulder surgery, give iron infusion given oral iron being  not effective, she reported outside CT showed pancreatic cyst that can be further evaluated with MRI in the future, return on 4/11/2022, platelet 443, gained weight, planning for hip replacement scheduled in 2 months but wants to see another orthopedist  to do it sooner but did not like her second opinion, meanwhile we arrange IVIG 1 g/kg for 2 sessions that did not seem to help, she lowered prednisone dose to 40 mg daily for better tolerance and platelet down to 75, which generally is acceptable for  her surgery, discussed to reduce to 20 mg daily for a week and then increase to 60 mg in the preop time to prepare, lab 5/26/2022 showed platelet 391, also arranged EKG as requested for preop exam showed normal sinus rhythm, normal UA, continue prednisone at current dose until after surgery then wean off by 20 mg/week, return as needed in the future. All questions are answered to their satisfaction. They will call for further questions and concerns. ECOG PERFORMANCE STATUS - 1- Restricted in physically strenuous activity but ambulatory and able to carry out work of a light or sedentary nature such as light house work, office work. Pain - 10 - Worst pain ever/10. Severe pain, requiring medication - see MAR     Fatigue - No flowsheet data found. Distress - No flowsheet data found. Total time independently spent on today's visit was *. This time included: face-to-face time evaluating the patient as well as additional non-face-to-face time spent on: Preparing to see the patient by obtaining and reviewing previous test results, records and medical history, Performing a medically appropriate history and exam and documenting relevant clinical information for this visit, Counseling and educating patient and family, Ordering medications, Communicating with other health care professionals and Referring patient to another health care provider. Elements of this note have been dictated via voice recognition software. Text and phrases may be limited by the accuracy and autoconversion of the software. The chart has been reviewed, but errors may still be present. Luke Hayward M.D.   [address], [address] [place] [address]  Office : [phone]  Fax : [phone]

## 2022-05-27 ENCOUNTER — CLINICAL DOCUMENTATION (OUTPATIENT)
Dept: ONCOLOGY | Age: 67
End: 2022-05-27

## 2022-05-27 NOTE — PROGRESS NOTES
Surgical clearance from hematology completed and faxed to [email or web address] Confirmation fax received.

## 2022-05-28 LAB
BACTERIA SPEC CULT: NORMAL
SERVICE CMNT-IMP: NORMAL

## 2022-06-06 ENCOUNTER — TELEPHONE (OUTPATIENT)
Dept: ONCOLOGY | Age: 67
End: 2022-06-06

## 2022-06-06 NOTE — TELEPHONE ENCOUNTER
Pt called and stated her surgery has been rescheduled to 8/1/22. She asked about weaning prednisone. Per Dr. Rosalba Centeno direction, I instructed her to decrease her prednisone by 20 mg weekly. She is currently taking 60 mg daily. Starting tomorrow she will decrease to 40 mg daily then 20 mg daily starting next week then she will stop after one week of taking 20 mg. We will bring her back 3 weeks prior to her planned surgery to evaluate ITP status. Pt is in agreement and verbalized understanding of instructions and plan.

## 2022-06-06 NOTE — TELEPHONE ENCOUNTER
Message received from  that this pt being treated for ITP wanted to speak to me in regards to her ortho surgery. Call back to pt and LVM to call me back.

## 2022-06-07 RX ORDER — DONEPEZIL HYDROCHLORIDE 10 MG/1
TABLET, FILM COATED ORAL
Qty: 60 TABLET | Refills: 3 | OUTPATIENT
Start: 2022-06-07

## 2022-06-07 NOTE — TELEPHONE ENCOUNTER
Beatriz Demarco is having hip surgery on August 1,2022. She needs prior authorization for a rollator and home health care for now. Has the physician seen you for this condition before? No   Preferred Specialist (if applicable)? Do you already have an appointment scheduled? No  Additional Information for Provider?   ---------------------------------------------------------------------------  --------------  CALL BACK INFO  What is the best way for the office to contact you? OK to leave message on   voicemail  Preferred Call Back Phone Number? [phone]  ---------------------------------------------------------------------------  --------------  SCRIPT ANSWERS  Relationship to Patient?  Self

## 2022-06-20 RX ORDER — LEVOTHYROXINE SODIUM 0.05 MG/1
TABLET ORAL
Qty: 30 TABLET | Refills: 5 | OUTPATIENT
Start: 2022-06-20

## 2022-06-23 RX ORDER — DONEPEZIL HYDROCHLORIDE 10 MG/1
TABLET, FILM COATED ORAL
Qty: 30 TABLET | Refills: 2 | Status: SHIPPED | OUTPATIENT
Start: 2022-06-23

## 2022-06-24 RX ORDER — BACLOFEN 10 MG/1
TABLET ORAL
Qty: 90 TABLET | Refills: 2 | OUTPATIENT
Start: 2022-06-24

## 2022-06-25 RX ORDER — BACLOFEN 10 MG/1
TABLET ORAL
Qty: 90 TABLET | Refills: 2 | OUTPATIENT
Start: 2022-06-25

## 2022-06-28 ENCOUNTER — TELEPHONE (OUTPATIENT)
Dept: FAMILY MEDICINE CLINIC | Facility: CLINIC | Age: 67
End: 2022-06-28

## 2022-06-28 DIAGNOSIS — F41.9 ANXIETY: Primary | ICD-10-CM

## 2022-06-28 RX ORDER — BUSPIRONE HYDROCHLORIDE 15 MG/1
15 TABLET ORAL 3 TIMES DAILY
Qty: 90 TABLET | Refills: 5 | Status: SHIPPED | OUTPATIENT
Start: 2022-06-28

## 2022-06-29 RX ORDER — VILAZODONE HYDROCHLORIDE 40 MG/1
TABLET ORAL
Qty: 90 TABLET | Refills: 1 | OUTPATIENT
Start: 2022-06-29

## 2022-06-30 RX ORDER — VILAZODONE HYDROCHLORIDE 40 MG/1
TABLET ORAL
Qty: 90 TABLET | Refills: 1 | OUTPATIENT
Start: 2022-06-30

## 2022-07-01 RX ORDER — VILAZODONE HYDROCHLORIDE 40 MG/1
TABLET ORAL
Qty: 90 TABLET | Refills: 1 | OUTPATIENT
Start: 2022-07-01

## 2022-07-08 DIAGNOSIS — D69.3 IDIOPATHIC THROMBOCYTOPENIC PURPURA (ITP) (HCC): Primary | ICD-10-CM

## 2022-07-15 RX ORDER — PREDNISONE 20 MG/1
TABLET ORAL
Qty: 63 TABLET | Refills: 0 | OUTPATIENT
Start: 2022-07-15

## 2022-09-06 RX ORDER — HYDROXYZINE 50 MG/1
TABLET, FILM COATED ORAL
Qty: 90 TABLET | Refills: 5 | OUTPATIENT
Start: 2022-09-06

## 2022-10-05 ENCOUNTER — TELEPHONE (OUTPATIENT)
Dept: FAMILY MEDICINE CLINIC | Facility: CLINIC | Age: 67
End: 2022-10-05

## 2022-10-05 NOTE — TELEPHONE ENCOUNTER
Vaccine Administration Notification received from [address] for Pneumovax 23. Placed in Dr Fransisco Lunsford.

## 2022-10-05 NOTE — TELEPHONE ENCOUNTER
Vaccine Administration Notification received from [address] [place] [address] for [address] [place] [address]. Placed in Dr Eddie Morgan.

## 2022-10-19 RX ORDER — LINACLOTIDE 72 UG/1
CAPSULE, GELATIN COATED ORAL
Qty: 30 CAPSULE | OUTPATIENT
Start: 2022-10-19

## 2023-02-06 DIAGNOSIS — M51.36 DDD (DEGENERATIVE DISC DISEASE), LUMBAR: ICD-10-CM

## 2023-02-06 RX ORDER — GABAPENTIN 300 MG/1
CAPSULE ORAL
Qty: 60 CAPSULE | Refills: 5 | OUTPATIENT
Start: 2023-02-06

## 2023-03-08 NOTE — LETTER
***
[facility] [address] [facility] [address]  Phone: [phone]  Fax: [phone]    RE: Javier Figueroa   : 1955      ,    The above patient was seen at our office 2022 for preoperative clearance. She is felt to be a reasonable risk for the anesthesia and surgery planned pending her upcoming preoperative labs and EKG (scheduled for 2022). Please contact us at the above phone number if there is any question or concern.       Sincerely,        Ajith Schultz PA-C/Hair Titus MD
08-Mar-2023 12:14

## 2023-06-20 RX ORDER — ESTRADIOL 1 MG/1
1 TABLET ORAL DAILY
Qty: 30 TABLET | Refills: 2 | Status: SHIPPED | OUTPATIENT
Start: 2023-06-20

## 2023-06-20 NOTE — TELEPHONE ENCOUNTER
TC from pt needing a refill of her estradiol. Pt is overdue for her annual by a year. Pt informed she will need to schedule this appointment and then I can refill her prescription to get to that visit. Scheduled for 8/14. Prescription refill sent to pharmacy pt specified.

## 2023-07-20 LAB
ANTIBODY: NON REACTIVE
AVERAGE GLUCOSE: ABNORMAL
HBA1C MFR BLD: 6.4 %

## 2023-08-16 DIAGNOSIS — D69.3 IDIOPATHIC THROMBOCYTOPENIC PURPURA (ITP) (HCC): Primary | ICD-10-CM

## 2023-10-02 ENCOUNTER — TELEPHONE (OUTPATIENT)
Dept: ONCOLOGY | Age: 68
End: 2023-10-02

## 2023-11-09 ENCOUNTER — OFFICE VISIT (OUTPATIENT)
Dept: OBGYN CLINIC | Age: 68
End: 2023-11-09

## 2023-11-09 VITALS
WEIGHT: 167 LBS | BODY MASS INDEX: 30.73 KG/M2 | DIASTOLIC BLOOD PRESSURE: 72 MMHG | SYSTOLIC BLOOD PRESSURE: 128 MMHG | HEIGHT: 62 IN

## 2023-11-09 DIAGNOSIS — N39.45 CONTINUOUS LEAKAGE OF URINE: ICD-10-CM

## 2023-11-09 DIAGNOSIS — Z13.1 SCREENING FOR DIABETES MELLITUS: ICD-10-CM

## 2023-11-09 DIAGNOSIS — R61 NIGHT SWEATS: ICD-10-CM

## 2023-11-09 DIAGNOSIS — Z01.419 WELL WOMAN EXAM WITH ROUTINE GYNECOLOGICAL EXAM: Primary | ICD-10-CM

## 2023-11-09 DIAGNOSIS — Z90.710 SCREENING FOR MALIGNANT NEOPLASM OF VAGINA AFTER TOTAL HYSTERECTOMY: ICD-10-CM

## 2023-11-09 DIAGNOSIS — Z12.39 ENCOUNTER FOR SCREENING FOR MALIGNANT NEOPLASM OF BREAST, UNSPECIFIED SCREENING MODALITY: ICD-10-CM

## 2023-11-09 DIAGNOSIS — N76.3 CHRONIC VULVITIS: ICD-10-CM

## 2023-11-09 DIAGNOSIS — F17.200 SMOKER: ICD-10-CM

## 2023-11-09 DIAGNOSIS — N76.6 VULVAR ULCER: ICD-10-CM

## 2023-11-09 DIAGNOSIS — Z12.72 SCREENING FOR MALIGNANT NEOPLASM OF VAGINA AFTER TOTAL HYSTERECTOMY: ICD-10-CM

## 2023-11-09 DIAGNOSIS — N89.8 VAGINAL ITCHING: ICD-10-CM

## 2023-11-09 DIAGNOSIS — Z87.898 HISTORY OF PREDIABETES: ICD-10-CM

## 2023-11-09 DIAGNOSIS — N95.1 INSOMNIA ASSOCIATED WITH MENOPAUSE: ICD-10-CM

## 2023-11-09 LAB
HIV 1+2 AB+HIV1 P24 AG SERPL QL IA: NONREACTIVE
HIV 1/2 RESULT COMMENT: NORMAL

## 2023-11-09 RX ORDER — CLOBETASOL PROPIONATE 0.5 MG/G
CREAM TOPICAL
Qty: 1 EACH | Refills: 3 | Status: CANCELLED | OUTPATIENT
Start: 2023-11-09

## 2023-11-09 NOTE — PROGRESS NOTES
pads due ot constant urinary incontinence    -Barrier ointment recommended   -FU 6wks to recheck sxs        4) Urinary incontinence:   -Referral to Dr. Tarah Beard   -Denies any UTI sxs    -BMI 30          Julia Stoddard MD

## 2023-11-10 ENCOUNTER — TELEPHONE (OUTPATIENT)
Dept: OBGYN CLINIC | Age: 68
End: 2023-11-10

## 2023-11-10 LAB
EST. AVERAGE GLUCOSE BLD GHB EST-MCNC: 126 MG/DL
HBA1C MFR BLD: 6 % (ref 4.8–5.6)
RPR SER QL: NONREACTIVE

## 2023-11-10 RX ORDER — HYDROXYCHLOROQUINE SULFATE 200 MG/1
TABLET, FILM COATED ORAL
COMMUNITY
Start: 2023-09-25

## 2023-11-10 RX ORDER — FLUOXETINE HYDROCHLORIDE 20 MG/1
20 CAPSULE ORAL DAILY
COMMUNITY

## 2023-11-10 RX ORDER — CLOBETASOL PROPIONATE 0.5 MG/G
CREAM TOPICAL
Qty: 1 EACH | Refills: 3 | Status: SHIPPED | OUTPATIENT
Start: 2023-11-10

## 2023-11-10 NOTE — TELEPHONE ENCOUNTER
Called pt to let her know where her prescriptions will be sent in and she also updated her medications with me over the phone. I explained to pt that I was going to remove the medications that she is not taking based on what she told me.

## 2023-11-12 PROBLEM — F17.200 SMOKER: Status: ACTIVE | Noted: 2019-04-04

## 2023-11-12 LAB
A VAGINAE DNA VAG QL NAA+PROBE: ABNORMAL SCORE
BVAB2 DNA VAG QL NAA+PROBE: ABNORMAL SCORE
C ALBICANS DNA VAG QL NAA+PROBE: NEGATIVE
C GLABRATA DNA VAG QL NAA+PROBE: NEGATIVE
MEGA1 DNA VAG QL NAA+PROBE: ABNORMAL SCORE
SPECIMEN SOURCE: ABNORMAL

## 2023-11-12 RX ORDER — CETIRIZINE HYDROCHLORIDE 10 MG/1
10 TABLET ORAL PRN
COMMUNITY

## 2023-11-12 RX ORDER — HYDROXYCHLOROQUINE SULFATE 200 MG/1
200 TABLET, FILM COATED ORAL 2 TIMES DAILY
COMMUNITY

## 2023-11-12 RX ORDER — OMEPRAZOLE 20 MG/1
CAPSULE, DELAYED RELEASE ORAL
COMMUNITY

## 2023-11-12 RX ORDER — ROSUVASTATIN CALCIUM 10 MG/1
TABLET, COATED ORAL
COMMUNITY
Start: 2023-10-31

## 2023-11-12 RX ORDER — METHYLPREDNISOLONE 4 MG/1
TABLET ORAL
COMMUNITY
Start: 2023-08-07

## 2023-11-12 RX ORDER — FLUCONAZOLE 150 MG/1
TABLET ORAL
COMMUNITY
Start: 2023-10-25

## 2023-11-12 RX ORDER — ALPRAZOLAM 1 MG/1
1 TABLET ORAL 2 TIMES DAILY PRN
COMMUNITY
Start: 2022-11-16

## 2023-11-12 RX ORDER — MOLNUPIRAVIR 200 MG/1
CAPSULE ORAL
COMMUNITY
Start: 2023-08-25

## 2023-11-12 RX ORDER — OMEPRAZOLE 20 MG/1
CAPSULE, DELAYED RELEASE ORAL
COMMUNITY
Start: 2023-11-09

## 2023-11-12 RX ORDER — AMLODIPINE BESYLATE 10 MG/1
TABLET ORAL
COMMUNITY

## 2023-11-12 RX ORDER — HYDROXYCHLOROQUINE SULFATE 200 MG/1
TABLET, FILM COATED ORAL
COMMUNITY

## 2023-11-12 RX ORDER — GABAPENTIN 300 MG/1
300 CAPSULE ORAL NIGHTLY
Qty: 90 CAPSULE | Refills: 3 | Status: SHIPPED | OUTPATIENT
Start: 2023-11-12 | End: 2024-11-11

## 2023-11-12 RX ORDER — IPRATROPIUM BROMIDE 42 UG/1
SPRAY, METERED NASAL
COMMUNITY
Start: 2023-08-21

## 2023-11-12 RX ORDER — FLUOXETINE 20 MG/5ML
SOLUTION ORAL
COMMUNITY

## 2023-11-12 RX ORDER — TRAZODONE HYDROCHLORIDE 100 MG/1
TABLET ORAL
COMMUNITY
Start: 2023-11-09

## 2023-11-20 LAB
HSV1 DNA SPEC QL NAA+PROBE: NORMAL
HSV2 DNA SPEC QL NAA+PROBE: NORMAL
SPECIMEN SOURCE: NORMAL

## 2023-11-29 ENCOUNTER — TELEPHONE (OUTPATIENT)
Dept: OBGYN CLINIC | Age: 68
End: 2023-11-29

## 2023-11-29 RX ORDER — VARENICLINE TARTRATE 1 MG/1
1 TABLET, FILM COATED ORAL 2 TIMES DAILY
COMMUNITY

## 2023-11-29 RX ORDER — URSODIOL 250 MG/1
TABLET, FILM COATED ORAL
COMMUNITY
Start: 2023-11-10

## 2023-11-29 NOTE — TELEPHONE ENCOUNTER
Pt requesting medication for hot flashes and night sweats, not able to sleep. She states nothing she has tried is helping and would like to go back on hormones if possible, if not can Dr Donis Goldmann recommend anything.   Pt inform will ask and let her know

## 2023-11-29 NOTE — PROGRESS NOTES
HRT is a reasonable option for pts with perimenopausal sxs (vasomotor, mood changes, vaginal dryness).  For pts who do not like the idea of HRT or are not HRT candidates, Paxil or Brisdell (SSRIs), SSNRIs, Clonidine, and Gabapentin are good alternative options for vasomotor sxs but will not treat vaginal atrophy.  25% of pts on Systemic estrogen therapy will still have vaginal symptoms requiring additional vaginal estrogen.  Should see improvement within 4-12 weeks.    Benefits of systemic estrogen include:  Increased bone density, reduced risk of fracture, reduced risk colon cancer, positive effects on lipids, cardioprotective if started within 10yrs of menopause, possible decreased risk of Alzheimer's Dz    Risks of systemic estrogen include:  Slightly increased risk stroke (8/10,000), breast cancer (only if taken WITH Progestins) 8/10,000, coronary heart dz, and VTE events, increased risk of CAD if started >10yrs after menopause (7/10,000)    *according to [facility] study among women receiving estrogen only, there is no increased risk of CV events or breast cancer     *transdermal estrogen may have lower risk of VTE than PO regimens

## 2023-11-30 RX ORDER — CLONIDINE HYDROCHLORIDE 0.1 MG/1
0.05 TABLET ORAL EVERY 12 HOURS
Qty: 60 TABLET | Refills: 1 | Status: SHIPPED | OUTPATIENT
Start: 2023-11-30

## 2023-11-30 NOTE — TELEPHONE ENCOUNTER
We can try Clonidine. Clonidine is a nonhormonal medicine that is been shown to be effective off label in reducing menopausal hot flashes. It is also used to treat high blood pressure and prevent migraine headaches. I would start with Rx for 25 mcg twice a day. We can potentially increase as tolerated to 50 to 75 mcg twice a day if needed but I would want to see her for FU in office, though, to see how she's doing on it. It may take a few weeks before seeing an appreciable difference. Dewey Zhu MD         Rx sent to pharmacy.

## 2023-12-01 ENCOUNTER — TELEPHONE (OUTPATIENT)
Dept: OBGYN CLINIC | Age: 68
End: 2023-12-01

## 2023-12-01 NOTE — TELEPHONE ENCOUNTER
See previous message from Dr Ton Montgomery - clonidine was sent to pts pharmacy on file. Explained to pt about 1/4 pill and then 1/2 pill. Pt also needs to follow up with PCP about this medication prior to start. Pt verbalized she would call today. Pt scheduled for recheck on 12/20/23.

## 2023-12-04 DIAGNOSIS — N76.0 BV (BACTERIAL VAGINOSIS): Primary | ICD-10-CM

## 2023-12-04 DIAGNOSIS — B96.89 BV (BACTERIAL VAGINOSIS): Primary | ICD-10-CM

## 2023-12-04 RX ORDER — METRONIDAZOLE 500 MG/1
500 TABLET ORAL 2 TIMES DAILY
Qty: 14 TABLET | Refills: 0 | Status: SHIPPED | OUTPATIENT
Start: 2023-12-04 | End: 2023-12-11

## 2023-12-04 NOTE — TELEPHONE ENCOUNTER
Patient has been notified. Please send RX per Provider order on result note: Give Rx for Flagyl 500 BID x 7 days.     Avoid alcohol for 48hrs from any dose

## 2024-02-26 RX ORDER — CLONIDINE HYDROCHLORIDE 0.1 MG/1
TABLET ORAL
Qty: 90 TABLET | Refills: 1 | OUTPATIENT
Start: 2024-02-26

## (undated) DEVICE — CANNULA NSL ORAL AD FOR CAPNOFLEX CO2 O2 AIRLFE

## (undated) DEVICE — SYR 3ML LL TIP 1/10ML GRAD --

## (undated) DEVICE — CONNECTOR TBNG OD5-7MM O2 END DISP

## (undated) DEVICE — KENDALL RADIOLUCENT FOAM MONITORING ELECTRODE RECTANGULAR SHAPE: Brand: KENDALL

## (undated) DEVICE — NDL PRT INJ NSAF BLNT 18GX1.5 --

## (undated) DEVICE — FORCEPS BX L240CM JAW DIA2.8MM L CAP W/ NDL MIC MESH TOOTH

## (undated) DEVICE — BLOCK BITE AD 60FR W/ VELC STRP ADDRESSES MOST PT AND

## (undated) DEVICE — SYR 5ML 1/5 GRAD LL NSAF LF --

## (undated) DEVICE — CONTAINER PREFIL FRMLN 40ML --